# Patient Record
Sex: FEMALE | Employment: OTHER | URBAN - NONMETROPOLITAN AREA
[De-identification: names, ages, dates, MRNs, and addresses within clinical notes are randomized per-mention and may not be internally consistent; named-entity substitution may affect disease eponyms.]

---

## 2021-09-28 ENCOUNTER — APPOINTMENT (OUTPATIENT)
Dept: CT IMAGING | Age: 81
DRG: 394 | End: 2021-09-28
Attending: HOSPITALIST
Payer: MEDICARE

## 2021-09-28 ENCOUNTER — HOSPITAL ENCOUNTER (INPATIENT)
Age: 81
LOS: 7 days | Discharge: NURSING, REHAB OR LONG-TERM CARE | DRG: 394 | End: 2021-10-05
Attending: HOSPITALIST | Admitting: INTERNAL MEDICINE
Payer: MEDICARE

## 2021-09-28 ENCOUNTER — APPOINTMENT (OUTPATIENT)
Dept: GENERAL RADIOLOGY | Age: 81
DRG: 394 | End: 2021-09-28
Attending: HOSPITALIST
Payer: MEDICARE

## 2021-09-28 PROBLEM — K57.90 DIVERTICULOSIS: Status: ACTIVE | Noted: 2021-09-28

## 2021-09-28 PROBLEM — I10 HYPERTENSION: Status: ACTIVE | Noted: 2021-09-28

## 2021-09-28 PROBLEM — K92.2 GIB (GASTROINTESTINAL BLEEDING): Status: ACTIVE | Noted: 2021-09-28

## 2021-09-28 PROBLEM — I25.10 CAD (CORONARY ARTERY DISEASE): Status: ACTIVE | Noted: 2021-09-28

## 2021-09-28 PROBLEM — E11.9 DIABETES (HCC): Status: ACTIVE | Noted: 2021-09-28

## 2021-09-28 LAB
ABO/RH: NORMAL
ALBUMIN SERPL-MCNC: 2.7 G/DL (ref 3.5–5.2)
ALP BLD-CCNC: 211 U/L (ref 35–104)
ALT SERPL-CCNC: 17 U/L (ref 5–33)
AMPHETAMINE SCREEN, URINE: NEGATIVE
AMYLASE: 30 U/L (ref 28–100)
ANION GAP SERPL CALCULATED.3IONS-SCNC: 7 MMOL/L (ref 7–19)
ANTIBODY SCREEN: NORMAL
APTT: 34 SEC (ref 26–36.2)
AST SERPL-CCNC: 18 U/L (ref 5–32)
ATYPICAL LYMPHOCYTE RELATIVE PERCENT: 1 % (ref 0–8)
BACTERIA: ABNORMAL /HPF
BANDED NEUTROPHILS RELATIVE PERCENT: 8 % (ref 0–5)
BARBITURATE SCREEN URINE: NEGATIVE
BASOPHILS ABSOLUTE: 0 K/UL (ref 0–0.2)
BASOPHILS RELATIVE PERCENT: 0 % (ref 0–1)
BENZODIAZEPINE SCREEN, URINE: NEGATIVE
BILIRUB SERPL-MCNC: 0.8 MG/DL (ref 0.2–1.2)
BILIRUBIN URINE: NEGATIVE
BLOOD, URINE: ABNORMAL
BUN BLDV-MCNC: 19 MG/DL (ref 8–23)
CALCIUM SERPL-MCNC: 8.9 MG/DL (ref 8.8–10.2)
CANNABINOID SCREEN URINE: NEGATIVE
CHLORIDE BLD-SCNC: 108 MMOL/L (ref 98–111)
CHOLESTEROL, TOTAL: 85 MG/DL (ref 160–199)
CLARITY: CLEAR
CO2: 23 MMOL/L (ref 22–29)
COARSE CASTS, UA: ABNORMAL /LPF (ref 0–5)
COCAINE METABOLITE SCREEN URINE: NEGATIVE
COLOR: YELLOW
CREAT SERPL-MCNC: 0.5 MG/DL (ref 0.5–0.9)
EOSINOPHILS ABSOLUTE: 0 K/UL (ref 0–0.6)
EOSINOPHILS RELATIVE PERCENT: 0 % (ref 0–5)
EPITHELIAL CELLS, UA: ABNORMAL /HPF
FERRITIN: 137.4 NG/ML (ref 13–150)
FOLATE: 5 NG/ML (ref 4.8–37.3)
GFR AFRICAN AMERICAN: >59
GFR NON-AFRICAN AMERICAN: >60
GLUCOSE BLD-MCNC: 93 MG/DL (ref 74–109)
GLUCOSE URINE: NEGATIVE MG/DL
HBA1C MFR BLD: 4.7 % (ref 4–6)
HCT VFR BLD CALC: 32.6 % (ref 37–47)
HCT VFR BLD CALC: 36.6 % (ref 37–47)
HDLC SERPL-MCNC: 47 MG/DL (ref 65–121)
HEMOGLOBIN: 10.7 G/DL (ref 12–16)
HEMOGLOBIN: 12 G/DL (ref 12–16)
HYALINE CASTS: ABNORMAL /LPF (ref 0–5)
IMMATURE GRANULOCYTES #: 0.1 K/UL
INR BLD: 1.27 (ref 0.88–1.18)
IRON SATURATION: 7 % (ref 14–50)
IRON: 17 UG/DL (ref 37–145)
KETONES, URINE: NEGATIVE MG/DL
LDL CHOLESTEROL CALCULATED: 25 MG/DL
LEUKOCYTE ESTERASE, URINE: NEGATIVE
LIPASE: 13 U/L (ref 13–60)
LYMPHOCYTES ABSOLUTE: 1.1 K/UL (ref 1.1–4.5)
LYMPHOCYTES RELATIVE PERCENT: 10 % (ref 20–40)
Lab: NORMAL
MAGNESIUM: 1.8 MG/DL (ref 1.6–2.4)
MCH RBC QN AUTO: 32.1 PG (ref 27–31)
MCHC RBC AUTO-ENTMCNC: 32.8 G/DL (ref 33–37)
MCV RBC AUTO: 97.9 FL (ref 81–99)
METAMYELOCYTES RELATIVE PERCENT: 5 %
MONOCYTES ABSOLUTE: 0.2 K/UL (ref 0–0.9)
MONOCYTES RELATIVE PERCENT: 2 % (ref 0–10)
NEUTROPHILS ABSOLUTE: 8.6 K/UL (ref 1.5–7.5)
NEUTROPHILS RELATIVE PERCENT: 74 % (ref 50–65)
NITRITE, URINE: NEGATIVE
OPIATE SCREEN URINE: NEGATIVE
PDW BLD-RTO: 12.9 % (ref 11.5–14.5)
PH UA: 5 (ref 5–8)
PLATELET # BLD: 194 K/UL (ref 130–400)
PLATELET SLIDE REVIEW: ADEQUATE
PMV BLD AUTO: 9.7 FL (ref 9.4–12.3)
POTASSIUM SERPL-SCNC: 3.5 MMOL/L (ref 3.5–5)
PROTEIN UA: ABNORMAL MG/DL
PROTHROMBIN TIME: 16 SEC (ref 12–14.6)
RBC # BLD: 3.74 M/UL (ref 4.2–5.4)
RBC # BLD: NORMAL 10*6/UL
RBC UA: ABNORMAL /HPF (ref 0–2)
SODIUM BLD-SCNC: 138 MMOL/L (ref 136–145)
SPECIFIC GRAVITY UA: 1.02 (ref 1–1.03)
TOTAL IRON BINDING CAPACITY: 227 UG/DL (ref 250–400)
TOTAL PROTEIN: 5.2 G/DL (ref 6.6–8.7)
TRIGL SERPL-MCNC: 66 MG/DL (ref 0–149)
UROBILINOGEN, URINE: 1 E.U./DL
VITAMIN B-12: 294 PG/ML (ref 211–946)
WBC # BLD: 9.9 K/UL (ref 4.8–10.8)
WBC UA: ABNORMAL /HPF (ref 0–5)

## 2021-09-28 PROCEDURE — 83540 ASSAY OF IRON: CPT

## 2021-09-28 PROCEDURE — 85018 HEMOGLOBIN: CPT

## 2021-09-28 PROCEDURE — 81001 URINALYSIS AUTO W/SCOPE: CPT

## 2021-09-28 PROCEDURE — 36415 COLL VENOUS BLD VENIPUNCTURE: CPT

## 2021-09-28 PROCEDURE — 82746 ASSAY OF FOLIC ACID SERUM: CPT

## 2021-09-28 PROCEDURE — 82150 ASSAY OF AMYLASE: CPT

## 2021-09-28 PROCEDURE — 82728 ASSAY OF FERRITIN: CPT

## 2021-09-28 PROCEDURE — 83690 ASSAY OF LIPASE: CPT

## 2021-09-28 PROCEDURE — 1210000000 HC MED SURG R&B

## 2021-09-28 PROCEDURE — 74176 CT ABD & PELVIS W/O CONTRAST: CPT

## 2021-09-28 PROCEDURE — 82607 VITAMIN B-12: CPT

## 2021-09-28 PROCEDURE — 80307 DRUG TEST PRSMV CHEM ANLYZR: CPT

## 2021-09-28 PROCEDURE — 80053 COMPREHEN METABOLIC PANEL: CPT

## 2021-09-28 PROCEDURE — 71045 X-RAY EXAM CHEST 1 VIEW: CPT

## 2021-09-28 PROCEDURE — 86901 BLOOD TYPING SEROLOGIC RH(D): CPT

## 2021-09-28 PROCEDURE — 6360000002 HC RX W HCPCS: Performed by: INTERNAL MEDICINE

## 2021-09-28 PROCEDURE — 85014 HEMATOCRIT: CPT

## 2021-09-28 PROCEDURE — 85025 COMPLETE CBC W/AUTO DIFF WBC: CPT

## 2021-09-28 PROCEDURE — 85730 THROMBOPLASTIN TIME PARTIAL: CPT

## 2021-09-28 PROCEDURE — 6360000002 HC RX W HCPCS: Performed by: NURSE PRACTITIONER

## 2021-09-28 PROCEDURE — 83550 IRON BINDING TEST: CPT

## 2021-09-28 PROCEDURE — 2580000003 HC RX 258: Performed by: NURSE PRACTITIONER

## 2021-09-28 PROCEDURE — 86900 BLOOD TYPING SEROLOGIC ABO: CPT

## 2021-09-28 PROCEDURE — 2580000003 HC RX 258: Performed by: INTERNAL MEDICINE

## 2021-09-28 PROCEDURE — 83735 ASSAY OF MAGNESIUM: CPT

## 2021-09-28 PROCEDURE — 6370000000 HC RX 637 (ALT 250 FOR IP): Performed by: INTERNAL MEDICINE

## 2021-09-28 PROCEDURE — 99233 SBSQ HOSP IP/OBS HIGH 50: CPT | Performed by: INTERNAL MEDICINE

## 2021-09-28 PROCEDURE — C9113 INJ PANTOPRAZOLE SODIUM, VIA: HCPCS | Performed by: INTERNAL MEDICINE

## 2021-09-28 PROCEDURE — 83036 HEMOGLOBIN GLYCOSYLATED A1C: CPT

## 2021-09-28 PROCEDURE — 80061 LIPID PANEL: CPT

## 2021-09-28 PROCEDURE — 85610 PROTHROMBIN TIME: CPT

## 2021-09-28 PROCEDURE — 86850 RBC ANTIBODY SCREEN: CPT

## 2021-09-28 RX ORDER — ACETAMINOPHEN 650 MG/1
650 SUPPOSITORY RECTAL EVERY 6 HOURS PRN
Status: DISCONTINUED | OUTPATIENT
Start: 2021-09-28 | End: 2021-10-05 | Stop reason: HOSPADM

## 2021-09-28 RX ORDER — M-VIT,TX,IRON,MINS/CALC/FOLIC 27MG-0.4MG
1 TABLET ORAL DAILY
Status: DISCONTINUED | OUTPATIENT
Start: 2021-09-28 | End: 2021-10-05 | Stop reason: HOSPADM

## 2021-09-28 RX ORDER — LISINOPRIL 5 MG/1
5 TABLET ORAL DAILY
COMMUNITY

## 2021-09-28 RX ORDER — SODIUM CHLORIDE 9 MG/ML
INJECTION, SOLUTION INTRAVENOUS CONTINUOUS
Status: DISCONTINUED | OUTPATIENT
Start: 2021-09-28 | End: 2021-10-02

## 2021-09-28 RX ORDER — SODIUM CHLORIDE 0.9 % (FLUSH) 0.9 %
5-40 SYRINGE (ML) INJECTION EVERY 12 HOURS SCHEDULED
Status: DISCONTINUED | OUTPATIENT
Start: 2021-09-28 | End: 2021-10-05 | Stop reason: HOSPADM

## 2021-09-28 RX ORDER — PANTOPRAZOLE SODIUM 40 MG/10ML
40 INJECTION, POWDER, LYOPHILIZED, FOR SOLUTION INTRAVENOUS EVERY 12 HOURS
Status: DISCONTINUED | OUTPATIENT
Start: 2021-09-28 | End: 2021-10-01

## 2021-09-28 RX ORDER — ATORVASTATIN CALCIUM 20 MG/1
20 TABLET, FILM COATED ORAL NIGHTLY
Status: DISCONTINUED | OUTPATIENT
Start: 2021-09-28 | End: 2021-10-05 | Stop reason: HOSPADM

## 2021-09-28 RX ORDER — ACETAMINOPHEN 325 MG/1
650 TABLET ORAL EVERY 6 HOURS PRN
Status: DISCONTINUED | OUTPATIENT
Start: 2021-09-28 | End: 2021-10-05 | Stop reason: HOSPADM

## 2021-09-28 RX ORDER — SODIUM CHLORIDE 9 MG/ML
10 INJECTION INTRAVENOUS EVERY 12 HOURS
Status: DISCONTINUED | OUTPATIENT
Start: 2021-09-28 | End: 2021-10-01

## 2021-09-28 RX ORDER — CARVEDILOL 3.12 MG/1
3.12 TABLET ORAL 2 TIMES DAILY WITH MEALS
Status: DISCONTINUED | OUTPATIENT
Start: 2021-09-28 | End: 2021-10-05 | Stop reason: HOSPADM

## 2021-09-28 RX ORDER — M-VIT,TX,IRON,MINS/CALC/FOLIC 27MG-0.4MG
1 TABLET ORAL DAILY
Status: ON HOLD | COMMUNITY
End: 2021-09-28

## 2021-09-28 RX ORDER — ASPIRIN 325 MG
325 TABLET ORAL DAILY
Status: ON HOLD | COMMUNITY
End: 2021-10-05 | Stop reason: HOSPADM

## 2021-09-28 RX ORDER — LACTOBACILLUS RHAMNOSUS GG 10B CELL
1 CAPSULE ORAL DAILY
COMMUNITY

## 2021-09-28 RX ORDER — SODIUM CHLORIDE 0.9 % (FLUSH) 0.9 %
5-40 SYRINGE (ML) INJECTION PRN
Status: DISCONTINUED | OUTPATIENT
Start: 2021-09-28 | End: 2021-10-05 | Stop reason: HOSPADM

## 2021-09-28 RX ORDER — ATORVASTATIN CALCIUM 20 MG/1
20 TABLET, FILM COATED ORAL NIGHTLY
COMMUNITY

## 2021-09-28 RX ORDER — LISINOPRIL 5 MG/1
5 TABLET ORAL DAILY
Status: DISCONTINUED | OUTPATIENT
Start: 2021-09-29 | End: 2021-10-05 | Stop reason: HOSPADM

## 2021-09-28 RX ORDER — CARVEDILOL 3.12 MG/1
3.12 TABLET ORAL 2 TIMES DAILY WITH MEALS
COMMUNITY

## 2021-09-28 RX ORDER — SODIUM CHLORIDE 9 MG/ML
25 INJECTION, SOLUTION INTRAVENOUS PRN
Status: DISCONTINUED | OUTPATIENT
Start: 2021-09-28 | End: 2021-10-05 | Stop reason: HOSPADM

## 2021-09-28 RX ORDER — FAMOTIDINE 20 MG/1
20 TABLET, FILM COATED ORAL 2 TIMES DAILY
COMMUNITY

## 2021-09-28 RX ORDER — ONDANSETRON 4 MG/1
4 TABLET, ORALLY DISINTEGRATING ORAL EVERY 8 HOURS PRN
Status: DISCONTINUED | OUTPATIENT
Start: 2021-09-28 | End: 2021-10-05 | Stop reason: HOSPADM

## 2021-09-28 RX ORDER — ONDANSETRON 2 MG/ML
4 INJECTION INTRAMUSCULAR; INTRAVENOUS EVERY 6 HOURS PRN
Status: DISCONTINUED | OUTPATIENT
Start: 2021-09-28 | End: 2021-10-05 | Stop reason: HOSPADM

## 2021-09-28 RX ADMIN — SODIUM CHLORIDE, PRESERVATIVE FREE 1000 MG: 5 INJECTION INTRAVENOUS at 17:05

## 2021-09-28 RX ADMIN — SODIUM CHLORIDE: 9 INJECTION, SOLUTION INTRAVENOUS at 18:39

## 2021-09-28 RX ADMIN — ATORVASTATIN CALCIUM 20 MG: 20 TABLET, FILM COATED ORAL at 19:34

## 2021-09-28 RX ADMIN — PANTOPRAZOLE SODIUM 40 MG: 40 INJECTION, POWDER, FOR SOLUTION INTRAVENOUS at 17:05

## 2021-09-28 RX ADMIN — SODIUM CHLORIDE, PRESERVATIVE FREE 10 ML: 5 INJECTION INTRAVENOUS at 17:06

## 2021-09-28 RX ADMIN — CARVEDILOL 3.12 MG: 3.12 TABLET, FILM COATED ORAL at 17:05

## 2021-09-28 RX ADMIN — Medication 1 TABLET: at 17:05

## 2021-09-28 ASSESSMENT — ENCOUNTER SYMPTOMS
DIARRHEA: 1
BACK PAIN: 0
BLOOD IN STOOL: 1
TROUBLE SWALLOWING: 0
NAUSEA: 1
CHEST TIGHTNESS: 0
SHORTNESS OF BREATH: 0
VOMITING: 1
ABDOMINAL PAIN: 1
SORE THROAT: 0

## 2021-09-28 NOTE — CONSULTS
[employer] [facility] VANESSA Dawson 78, [address] [facility]                                  CONSULTATION    PATIENT NAME: Rose Lopez                     :        1940  MED REC NO:   [id number]                              ROOM:       [facility]  ACCOUNT NO:   [de-identified]                           ADMIT DATE: 2021  PROVIDER:     Medina Mendoza MD    CONSULT DATE:  2021    ASSESSMENT:  1.  Sudden onset of weakness, dizziness, diaphoresis, nausea and  vomiting, significance to be clarified. The patient denies any other  upper GI complaints. 2.  Sudden bowel cramping, diarrhea and then passage of maroon stools  indicative of GI bleed. Possible infectious vs ischemic colitis. 3.  History of an episode of diverticulitis approximately 4 to 5 years  ago. RECOMMENDATIONS:  1. Continue treatment of urinary tract infection. 2.  Protonix. 3.  Monitor H and H.  4.  Clear liquid diet today and then consider bowel prep tomorrow  followed by EGD and colonoscopy on Thursday. HISTORY OF PRESENT ILLNESS:  This pleasant 80-year-old white female who  is interviewed with her daughter present in the room to help provide  clinical details of recent events and medical history. The patient has  a background history of coronary artery disease with prior stents,  hypertension, hyperlipidemia, and diverticulosis. She also had a  surgical history of undergoing a craniotomy for aneurysm a few years  ago. The patient denies any prior GI illnesses in the past.. She was  otherwise well except for possibly some tendency towards mild  constipation interpreted as passing hard pellet stool daily. She ate   dinner with her daughter, but then suddenly developed weakness,  dizziness, nausea, vomiting, diaphoresis and then eventually bowel  cramping with diarrhea. She then began passing maroon colored stools  with some bright red blood.   The daughter had ate the same meal and  therefore, there is no suspicion of any contaminated food or beverage. There is no history of travel or exposures. The patient currently  denies any abdominal pain or tenderness on exam.  Labs at the referring  hospital showed initial CBC with hemoglobin 17 falling to 12 gm. CAT  scan of the abdomen is reported to show diverticulosis without  diverticulitis and moderate amount of retained stool in the bowel  despite the diarrhea. She denies any background history of fever or  weight loss. PAST MEDICAL HISTORY:  Medical illnesses are mentioned above. The  patient has no background history of GERD, peptic ulcer disease, or  prior GI bleeding. SURGICAL HISTORY:  Brain aneurysm repair. No prior abdominal surgeries. No prior EGD or colonoscopy in the past.    CURRENT MAINTENANCE MEDICATIONS:  See admission medication  reconciliation list.    SOCIAL HISTORY:  No current habits. FAMILY HISTORY:  Noncontributory. PHYSICAL EXAMINATION:  GENERAL:  Alert, no acute distress, afebrile. VITAL SIGNS:  Hemodynamically stable. HEENT:  Sclerae white. Conjunctivae pink. Buccal mucosa moist.  NECK:  Supple. LUNGS:  Clear. HEART:  No S3 or murmur. ABDOMEN:  Nontender abdomen. Normal bowel sounds. No organomegaly. Stool heme-positive. The procedures of EGD and colonoscopy have been discussed with the  patient including indication, alternatives, and risks.         Saray Purcell MD    D: 09/28/2021 17:10:53      T: 09/28/2021 17:15:11     [email or web address]  Job#: [id number]     Doc#: [id number]    CC:

## 2021-09-28 NOTE — PROGRESS NOTES
[de-identified]year old with a PMH of CAD s/p stent years ago, HTN, HLD, Diverticulosis presenting from [facility] as a transfer after she was noted to have GIB for GI consultation. Patient originally presented to their facility 9/26/21 for NBNB nausea and vomiting found to have a moderate to large amount of stool on CT abd/pel with patient receiving enemas and stool softeners. Noted to have maroon colored blood per rectum on nursing report. Patient is AAOx3 (person, place, time) but isn't the greatest historian. Comfortable currently and denies chest pain, dyspnea, nausea, vomiting, diarrhea, constipation, fevers, chills, sweats, dysuria, cough, sick contacts, or recent travel. Vitals/Labs/Imaging reviewed. Alert/BLAE/RRR    GIB: GI consulted. Monitor H/H and transfer PRN. CT A/P.  HTN: Monitor BP and adjust medications PRN. CAD: Telemetry. Denies chest pain. Statin. No aspirin for GIB. Supportive management. Full H&P to follow with hospitalist nurse practitioner.

## 2021-09-28 NOTE — PROGRESS NOTES
4 Eyes Skin Assessment    Annia Friedman is being assessed upon: Admission    I agree that Manny Hollins, RN, along with Merline Muff, RN (either 2 RN's or 1 LPN and 1 RN) have performed a thorough Head to Toe Skin Assessment on the patient. ALL assessment sites listed below have been assessed. Areas assessed by both nurses:     [x]   Head, Face, and Ears   [x]   Shoulders, Back, and Chest  [x]   Arms, Elbows, and Hands   [x]   Coccyx, Sacrum, and Ischium  [x]   Legs, Feet, and Heels    Does the Patient have Skin Breakdown?  No    Zay Prevention initiated: NA  Wound Care Orders initiated: NA    [facility] nurse consulted for Pressure Injury (Stage 3,4, Unstageable, DTI, NWPT, and Complex wounds) and New or Established Ostomies: NA        Primary Nurse eSignature: Lizet Herron RN on 9/28/2021 at 4:21 PM      Co-Signer eSignature: Electronically signed by Merline Muff, RN on 9/28/21 at 6:13 PM CDT

## 2021-09-28 NOTE — H&P
Marcellus Masters - History & Physical      PCP: No primary care provider on file. Date of Admission: 9/28/2021    Date of Service: 9/28/2021    Chief Complaint:  GIB    History Of Present Illness: The patient is a [de-identified] y.o. female who presented to [facility] from OSH ([address]) complaining of GIB. She originally presented to OSH for N/V/D and abd pain. She states she was also having some urinary frequency and urgency at the time of admission to OSH. At outside hospital patient had drop in hemoglobin from 15.7 to 12.5 with hemoccult positive stool. She states the nausea and vomiting have improved. She has continued to have moderate sized dark BMs. Patient was transferred to [facility] for GI services. Past Medical History:        Diagnosis Date    Brain aneurysm 2019    history of brain aneurysm with surgical intervention at [facility] in [facility] CAD (coronary artery disease)     Diverticulitis     H/O heart artery stent     Hypertension        Past Surgical History:        Procedure Laterality Date    CRANIOTOMY      [address] Medications:  Prior to Admission medications    Medication Sig Start Date End Date Taking?  Authorizing Provider   aspirin 325 MG tablet Take 325 mg by mouth daily   Yes Historical Provider, MD   atorvastatin (LIPITOR) 20 MG tablet Take 20 mg by mouth nightly   Yes Historical Provider, MD   carvedilol (COREG) 3.125 MG tablet Take 3.125 mg by mouth 2 times daily (with meals)   Yes Historical Provider, MD   lactobacillus (CULTURELLE) CAPS capsule Take 1 capsule by mouth daily   Yes Historical Provider, MD   famotidine (PEPCID) 20 MG tablet Take 20 mg by mouth 2 times daily   Yes Historical Provider, MD   lisinopril (PRINIVIL;ZESTRIL) 5 MG tablet Take 5 mg by mouth daily   Yes Historical Provider, MD   Omega-3 Fatty Acids (OMEGA 3 PO) Take 1 capsule by mouth 2 times daily   Yes Historical Provider, MD   NONFORMULARY 1 capsule daily [employer]   Yes Historical Provider, MD       Allergies:    Adhesive tape and Phenergan [promethazine]    Social History:    The patient currently lives with her daughter  Tobacco:   has no history on file for tobacco use. Alcohol:   reports current alcohol use of about 7.0 standard drinks of alcohol per week. Illicit Drugs: Never    Family History:  No family history on file. Review of Systems:   Review of Systems   Constitutional: Positive for activity change, appetite change and fatigue. Negative for chills and fever. HENT: Negative for nosebleeds, sore throat and trouble swallowing. Respiratory: Negative for chest tightness and shortness of breath. Cardiovascular: Negative for chest pain and leg swelling. Gastrointestinal: Positive for abdominal pain, blood in stool, diarrhea, nausea and vomiting. Genitourinary: Positive for dysuria, frequency and urgency. Negative for difficulty urinating. Musculoskeletal: Negative for back pain and myalgias. Neurological: Negative for dizziness, light-headedness and headaches. Psychiatric/Behavioral: Negative for agitation and confusion. 14 point review of systems is negative except as specifically addressed above. Physical Examination:  BP (!) 149/89   Pulse 98   Temp 98.6 °F (37 °C) (Temporal)   Resp 20   SpO2 90%   Physical Exam  Constitutional:       Appearance: She is not ill-appearing. HENT:      Head: Normocephalic. Nose: Nose normal.      Mouth/Throat:      Mouth: Mucous membranes are moist.      Pharynx: Oropharynx is clear. Eyes:      Extraocular Movements: Extraocular movements intact. Pupils: Pupils are equal, round, and reactive to light. Cardiovascular:      Rate and Rhythm: Regular rhythm. Tachycardia present. Pulses: Normal pulses. Heart sounds: Normal heart sounds. Pulmonary:      Effort: Pulmonary effort is normal.      Breath sounds: Normal breath sounds. Abdominal:      General: Abdomen is flat. Bowel sounds are normal. There is no distension. Palpations: Abdomen is soft. Tenderness: There is abdominal tenderness (LLQ). There is no guarding or rebound. Musculoskeletal:         General: Normal range of motion. Cervical back: Normal range of motion and neck supple. Right lower leg: No edema. Left lower leg: No edema. Skin:     General: Skin is warm and dry. Capillary Refill: Capillary refill takes less than 2 seconds. Coloration: Skin is pale. Neurological:      General: No focal deficit present. Mental Status: She is alert and oriented to person, place, and time. Diagnostic Data:  CBC:No results for input(s): WBC, HGB, HCT, PLT in the last 72 hours. BMP:No results for input(s): NA, K, CL, CO2, BUN, CREATININE, CALCIUM, PHOS in the last 72 hours. Invalid input(s): Kerrie Villalobos results for input(s): AST, ALT, BILIDIR, BILITOT, ALKPHOS in the last 72 hours. Coag Panel: No results for input(s): INR, PROTIME, APTT in the last 72 hours. Cardiac Enzymes: No results for input(s): Sandi Gazella in the last 72 hours. ABGs:No results found for: PHART, PO2ART, FYU1DLF  Urinalysis:No results found for: NITRU, WBCUA, BACTERIA, RBCUA, BLOODU, SPECGRAV, GLUCOSEU  A1C: No results for input(s): LABA1C in the last 72 hours. ABG:No results for input(s): PHART, STW0MRV, PO2ART, BFB0WXR, BEART, HGBAE, J6WQRYWI, CARBOXHGBART in the last 72 hours. No results found.     Assessment/Plan:  Principal Problem:    GIB (gastrointestinal bleeding)   -GI consult   -ct abd/pelvis   -serial HH   -amylase/lipase   -protonix IV   -type and screen, will transfuse if needed    Active Problems:    Hypertension   -continue home medications   -monitor closely      CAD (coronary artery disease)   -noted, hold ASA      Diverticulosis   -noted  UTI   -noted at OSH   -rocephin   -repeat UA     Resolved Problems:    * No resolved hospital problems.  *       Signed:  SAVANAH Ramírez - CNP, 9/28/2021 3:54 PM

## 2021-09-28 NOTE — PROGRESS NOTES
Andrei Sanchez arrived to room # 314. Presented with: GI Bleed, UTI  Mental Status: Patient is oriented, alert, coherent, logical, thought processes intact and able to concentrate and follow conversation. Vitals:    09/28/21 1447   BP: (!) 149/89   Pulse: 98   Resp: 20   Temp: 98.6 °F (37 °C)   SpO2: 90%     Patient safety contract and falls prevention contract reviewed with patient Yes. Oriented Patient and Family to room. Call light within reach. Yes.   Needs, issues or concerns expressed at this time: no.      Electronically signed by Thalia Plata RN on 9/28/2021 at 4:20 PM

## 2021-09-29 PROBLEM — K52.9 COLITIS: Status: ACTIVE | Noted: 2021-09-29

## 2021-09-29 LAB
ALBUMIN SERPL-MCNC: 2.6 G/DL (ref 3.5–5.2)
ALP BLD-CCNC: 169 U/L (ref 35–104)
ALT SERPL-CCNC: 19 U/L (ref 5–33)
ANION GAP SERPL CALCULATED.3IONS-SCNC: 7 MMOL/L (ref 7–19)
AST SERPL-CCNC: 20 U/L (ref 5–32)
BANDED NEUTROPHILS RELATIVE PERCENT: 7 % (ref 0–5)
BASOPHILS ABSOLUTE: 0 K/UL (ref 0–0.2)
BASOPHILS RELATIVE PERCENT: 0 % (ref 0–1)
BILIRUB SERPL-MCNC: 0.6 MG/DL (ref 0.2–1.2)
BUN BLDV-MCNC: 13 MG/DL (ref 8–23)
C DIFF TOXIN/ANTIGEN: NORMAL
CALCIUM SERPL-MCNC: 8 MG/DL (ref 8.8–10.2)
CHLORIDE BLD-SCNC: 109 MMOL/L (ref 98–111)
CO2: 22 MMOL/L (ref 22–29)
CREAT SERPL-MCNC: 0.4 MG/DL (ref 0.5–0.9)
EOSINOPHILS ABSOLUTE: 0.28 K/UL (ref 0–0.6)
EOSINOPHILS RELATIVE PERCENT: 3 % (ref 0–5)
GFR AFRICAN AMERICAN: >59
GFR NON-AFRICAN AMERICAN: >60
GLUCOSE BLD-MCNC: 103 MG/DL (ref 74–109)
HCT VFR BLD CALC: 31.6 % (ref 37–47)
HCT VFR BLD CALC: 33.7 % (ref 37–47)
HCT VFR BLD CALC: 33.7 % (ref 37–47)
HCT VFR BLD CALC: 36.3 % (ref 37–47)
HCT VFR BLD CALC: 36.4 % (ref 37–47)
HEMOGLOBIN: 10.5 G/DL (ref 12–16)
HEMOGLOBIN: 11.1 G/DL (ref 12–16)
HEMOGLOBIN: 11.3 G/DL (ref 12–16)
HEMOGLOBIN: 11.8 G/DL (ref 12–16)
HEMOGLOBIN: 11.9 G/DL (ref 12–16)
IMMATURE GRANULOCYTES #: 0.1 K/UL
LYMPHOCYTES ABSOLUTE: 0.8 K/UL (ref 1.1–4.5)
LYMPHOCYTES RELATIVE PERCENT: 9 % (ref 20–40)
MAGNESIUM: 1.7 MG/DL (ref 1.6–2.4)
MCH RBC QN AUTO: 31.6 PG (ref 27–31)
MCHC RBC AUTO-ENTMCNC: 32.5 G/DL (ref 33–37)
MCV RBC AUTO: 97.3 FL (ref 81–99)
MONOCYTES ABSOLUTE: 0.1 K/UL (ref 0–0.9)
MONOCYTES RELATIVE PERCENT: 1 % (ref 0–10)
NEUTROPHILS ABSOLUTE: 8.1 K/UL (ref 1.5–7.5)
NEUTROPHILS RELATIVE PERCENT: 80 % (ref 50–65)
PDW BLD-RTO: 12.8 % (ref 11.5–14.5)
PLATELET # BLD: 223 K/UL (ref 130–400)
PLATELET SLIDE REVIEW: ADEQUATE
PMV BLD AUTO: 9.8 FL (ref 9.4–12.3)
POTASSIUM SERPL-SCNC: 3.2 MMOL/L (ref 3.5–5)
RBC # BLD: 3.73 M/UL (ref 4.2–5.4)
RBC # BLD: NORMAL 10*6/UL
SODIUM BLD-SCNC: 138 MMOL/L (ref 136–145)
TOTAL PROTEIN: 4.4 G/DL (ref 6.6–8.7)
WBC # BLD: 9.3 K/UL (ref 4.8–10.8)

## 2021-09-29 PROCEDURE — 85025 COMPLETE CBC W/AUTO DIFF WBC: CPT

## 2021-09-29 PROCEDURE — 2580000003 HC RX 258: Performed by: INTERNAL MEDICINE

## 2021-09-29 PROCEDURE — 36415 COLL VENOUS BLD VENIPUNCTURE: CPT

## 2021-09-29 PROCEDURE — 85018 HEMOGLOBIN: CPT

## 2021-09-29 PROCEDURE — 6360000002 HC RX W HCPCS: Performed by: INTERNAL MEDICINE

## 2021-09-29 PROCEDURE — 87449 NOS EACH ORGANISM AG IA: CPT

## 2021-09-29 PROCEDURE — 2500000003 HC RX 250 WO HCPCS: Performed by: NURSE PRACTITIONER

## 2021-09-29 PROCEDURE — 85014 HEMATOCRIT: CPT

## 2021-09-29 PROCEDURE — 99232 SBSQ HOSP IP/OBS MODERATE 35: CPT | Performed by: INTERNAL MEDICINE

## 2021-09-29 PROCEDURE — 87324 CLOSTRIDIUM AG IA: CPT

## 2021-09-29 PROCEDURE — 6370000000 HC RX 637 (ALT 250 FOR IP): Performed by: INTERNAL MEDICINE

## 2021-09-29 PROCEDURE — 80053 COMPREHEN METABOLIC PANEL: CPT

## 2021-09-29 PROCEDURE — 6360000002 HC RX W HCPCS: Performed by: NURSE PRACTITIONER

## 2021-09-29 PROCEDURE — C9113 INJ PANTOPRAZOLE SODIUM, VIA: HCPCS | Performed by: INTERNAL MEDICINE

## 2021-09-29 PROCEDURE — 87045 FECES CULTURE AEROBIC BACT: CPT

## 2021-09-29 PROCEDURE — 87427 SHIGA-LIKE TOXIN AG IA: CPT

## 2021-09-29 PROCEDURE — 83735 ASSAY OF MAGNESIUM: CPT

## 2021-09-29 PROCEDURE — 87899 AGENT NOS ASSAY W/OPTIC: CPT

## 2021-09-29 PROCEDURE — 87015 SPECIMEN INFECT AGNT CONCNTJ: CPT

## 2021-09-29 PROCEDURE — 2580000003 HC RX 258: Performed by: NURSE PRACTITIONER

## 2021-09-29 PROCEDURE — 1210000000 HC MED SURG R&B

## 2021-09-29 RX ORDER — POTASSIUM CHLORIDE 7.45 MG/ML
10 INJECTION INTRAVENOUS PRN
Status: DISCONTINUED | OUTPATIENT
Start: 2021-09-29 | End: 2021-10-05 | Stop reason: HOSPADM

## 2021-09-29 RX ORDER — POLYETHYLENE GLYCOL 3350 17 G/17G
152 POWDER, FOR SOLUTION ORAL ONCE
Status: COMPLETED | OUTPATIENT
Start: 2021-09-29 | End: 2021-09-29

## 2021-09-29 RX ORDER — POTASSIUM CHLORIDE 20 MEQ/1
40 TABLET, EXTENDED RELEASE ORAL PRN
Status: DISCONTINUED | OUTPATIENT
Start: 2021-09-29 | End: 2021-10-05 | Stop reason: HOSPADM

## 2021-09-29 RX ADMIN — PANTOPRAZOLE SODIUM 40 MG: 40 INJECTION, POWDER, FOR SOLUTION INTRAVENOUS at 16:11

## 2021-09-29 RX ADMIN — METRONIDAZOLE 500 MG: 500 INJECTION, SOLUTION INTRAVENOUS at 08:38

## 2021-09-29 RX ADMIN — LISINOPRIL 5 MG: 5 TABLET ORAL at 08:37

## 2021-09-29 RX ADMIN — METRONIDAZOLE 500 MG: 500 INJECTION, SOLUTION INTRAVENOUS at 16:10

## 2021-09-29 RX ADMIN — SODIUM CHLORIDE, PRESERVATIVE FREE 1000 MG: 5 INJECTION INTRAVENOUS at 16:11

## 2021-09-29 RX ADMIN — CARVEDILOL 3.12 MG: 3.12 TABLET, FILM COATED ORAL at 08:37

## 2021-09-29 RX ADMIN — CARVEDILOL 3.12 MG: 3.12 TABLET, FILM COATED ORAL at 16:11

## 2021-09-29 RX ADMIN — SODIUM CHLORIDE, PRESERVATIVE FREE 10 ML: 5 INJECTION INTRAVENOUS at 16:12

## 2021-09-29 RX ADMIN — SODIUM CHLORIDE: 9 INJECTION, SOLUTION INTRAVENOUS at 08:40

## 2021-09-29 RX ADMIN — SODIUM CHLORIDE, PRESERVATIVE FREE 10 ML: 5 INJECTION INTRAVENOUS at 05:21

## 2021-09-29 RX ADMIN — POLYETHYLENE GLYCOL 3350 152 G: 17 POWDER, FOR SOLUTION ORAL at 18:00

## 2021-09-29 RX ADMIN — Medication 1 TABLET: at 08:37

## 2021-09-29 RX ADMIN — ATORVASTATIN CALCIUM 20 MG: 20 TABLET, FILM COATED ORAL at 20:09

## 2021-09-29 RX ADMIN — PANTOPRAZOLE SODIUM 40 MG: 40 INJECTION, POWDER, FOR SOLUTION INTRAVENOUS at 05:21

## 2021-09-29 ASSESSMENT — ENCOUNTER SYMPTOMS
SHORTNESS OF BREATH: 0
BACK PAIN: 0
BLOOD IN STOOL: 1
TROUBLE SWALLOWING: 0
CHEST TIGHTNESS: 0
DIARRHEA: 1
ABDOMINAL PAIN: 1
NAUSEA: 0
SORE THROAT: 0
VOMITING: 0

## 2021-09-29 NOTE — PROGRESS NOTES
Acute abdominal pain, nausea and vomiting subsided, non-acute GI Bleed stable with Hgb 10.5 gm, stool cultures pending, CT shows a segmental colitis of left colon suspicious for ischemic colitis and not diverticulitis. Plan: bowel prep today the EGD and colonoscopy tomorrow.

## 2021-09-29 NOTE — CARE COORDINATION
Date / Time of Evaluation:   9/29/2021    2:08 PM  Assessment Completed by:   JONATHAN Weber      Patient Name:   Yevgeniy Cobos  MRN:   [id number]  [place]:   1940    Patient Admission Status:   Inpatient [101]    Patient Contact Information:    [address] [place] [address]  [phone] (home)   No relevant phone numbers on file. Above information verified? [x]   Yes  []   No    (Best Practice:   Have patient/caregiver verify above address and phone number by stating out loud their current address and reachable phone number. Initial Assessment Completed at bedside with:      [x]   Patient  [x]   Family/Caregiver/Guardian   []   Other:      Current PCP:  Dr. Rox Pacheco in [address]        PCP verified? [x]   Yes  []   No    Emergency Contacts:    Extended Emergency Contact Information  Primary Emergency Contact: [address] [facility], [address] Phone: [phone]  Relation: Child  Secondary Emergency Contact: Monica King  [employer] Phone: [phone]  Relation: Child    Advance Directives:    Does Ms. Sariah Carmona have an advance directive in her electronic medical record? []   Yes  [x]   No    Code Status:   Full Code      Have you been vaccinated for COVID-19 (SARS-CoV-2)? [x]   Yes  []   No                   If so, when?     Which :         []   Pfizer-BioNTech  [x]   Moderna  []   [employer]  []   Other:       Do you have any of the following unmet social needs that would keep you from returning home safely:    []   Yes  [x]   No                    Unmet Social Needs:           []   Living Situation/Housing  []   Food  []   Stroke Education   []   Utilities  []   Personal Safety  []   Financial Strain  []   Employment  []   Mental Health  []   Substance Abuse  []   Transportation Barriers    Additional Unmet Social Needs Notes:           Financial:    Payor: Chong Martin / Plan: [facility] [employer] / Product Type: *No Product type* /     Pre-Cert required for SNF: [x]   Yes  []   No    Have Long Term Care Insurance:      []   Yes  [x]   No      Pharmacy:    [facility] [address] [place] [address] [phone] [facility] [phone]  [address] [place] [id number]  Phone: [phone] Fax: [phone]    Potential assistance purchasing medications? []   Yes  [x]   No      ADLS:       Support System:    children;       Current Home Environment:   Home with daughterBubba    Steps:       [x]   Yes  []   No    If yes, how many? Plans to RETURN to current housing:  Either swing bed or HH    [x]   Yes  []   No    Barriers to RETURNING to current housing:        Currently ACTIVE with Home Health CARE:      []   Yes  [x]   No    Home Health Care Agency:        DME Provider:    None ; has a cane only      Had [address] [employer] [facility] prior to admission:      []   Yes  [x]   No        Active with HD/PD prior to admission:             []   Yes  [x]   No    Nephrologist:     HD Center:         Transition Plan:    possible for [address] bed vs [address] [place] [address] for Discharge:  Private vehicle    Factors facilitating achievement of predicted outcomes:     Barriers to discharge:        Patient Deficits:    []   Yes   [x]   No    If yes:    []   Confusion/Memory  []   Visual  []   Motor/Sensory         []   Right arm         []   Right leg         []   Left arm         []   Left leg  []   Language/Speech         []   Aphasia         []   Dysarthria         []   Swallow         Anne Coma Scale  Eye Opening: Spontaneous  Best Verbal Response: Oriented  Best Motor Response: Obeys commands  [place] Coma Scale Score: 15    Patient Deficit Notes:            Additional CM/SW Notes: Pt sitting up in bed; daughter sitting by the window in the room; lives at home with daughter; no HH at current; but if d/c's home, they would like it ordered; daughter is interested in [address] as an option;         Wilton Machado and/or her family were provided with choice of provider:    [x]   Yes   []   No        []   Stroke education booklet reviewed and given to patient/family/caregiver/guardian. All questions answered all questions answered appropriately and efficiently per family.       Neena Mirza [facility]  Phone:   [phone]          Fax:   [phone]  Electronically signed by JONATHAN Mirza on 9/29/2021 at 4:27 PM

## 2021-09-29 NOTE — PROGRESS NOTES
One BM at this time- clear mucousy output with strawberry red mucousy output- medium size.     Electronically signed by Juana Mosqueda RN on 9/28/2021 at 7:20 PM

## 2021-09-29 NOTE — PROGRESS NOTES
[address] [facility]      Patient:  Kaleta Dakin  YOB: 1940  Date of Service: 9/29/2021  MRN: [id number]   Acct: [de-identified]   Primary Care Physician: No primary care provider on file. Advance Directive: Full Code  Admit Date: 9/28/2021       Hospital Day: 1  Portions of this note have been copied forward, however, changed to reflect the most current clinical status of this patient. CHIEF COMPLAINT GIB    SUBJECTIVE:  Patient is sitting up in the chair this morning. She reports one BM overnight and one this morning. She states \"they were both mucousy. \" She denies nausea and vomiting. Reports improvement in pain. CUMULATIVE HOSPITAL COURSE:  The patient is a [de-identified] y.o. female who presented to [facility] from OSH ([address]) complaining of GIB. She originally presented to OSH for N/V/D and abd pain. She stated she was also having some urinary frequency and urgency at the time of admission to OSH. At outside hospital patient had drop in hemoglobin from 15.7 to 12.5 with hemoccult positive stool. She stated the nausea and vomiting had improved. She had continued to have moderate sized dark BMs. Patient was transferred to [address] [place] [address] for GI services. Monitoring serial HH with improvement. Rocephin for UTI noted at OSH. Added flagyl this AM as CT indicated possible colitis. GI is following, discussed possible colonoscopy with patient. Will attempt to obtain stool culture. Review of Systems:   Review of Systems   Constitutional: Positive for activity change, appetite change (improved) and fatigue. Negative for chills and fever. HENT: Negative for nosebleeds, sore throat and trouble swallowing. Respiratory: Negative for chest tightness and shortness of breath. Cardiovascular: Negative for chest pain and leg swelling. Gastrointestinal: Positive for abdominal pain, blood in stool and diarrhea. Negative for nausea and vomiting.    Genitourinary: Negative for difficulty urinating, dysuria, frequency and urgency. Musculoskeletal: Negative for back pain and myalgias. Neurological: Negative for dizziness, light-headedness and headaches. Psychiatric/Behavioral: Negative for agitation and confusion. 14 point review of systems is negative except as specifically addressed above. Objective:   VITALS:  BP (!) 122/53   Pulse 85   Temp 98.4 °F (36.9 °C)   Resp 16   Wt 163 lb 6 oz (74.1 kg)   SpO2 93%   24HR INTAKE/OUTPUT:    Intake/Output Summary (Last 24 hours) at 9/29/2021 1116  Last data filed at 9/29/2021 1017  Gross per 24 hour   Intake 1081 ml   Output --   Net 1081 ml       Physical Exam  Constitutional:       Appearance: She is not ill-appearing. HENT:      Head: Normocephalic. Nose: Nose normal.      Mouth/Throat:      Mouth: Mucous membranes are moist.      Pharynx: Oropharynx is clear. Eyes:      Extraocular Movements: Extraocular movements intact. Pupils: Pupils are equal, round, and reactive to light. Cardiovascular:      Rate and Rhythm: Normal rate and regular rhythm. Pulses: Normal pulses. Heart sounds: Normal heart sounds. Pulmonary:      Effort: Pulmonary effort is normal.      Breath sounds: Normal breath sounds. Abdominal:      General: Abdomen is flat. Bowel sounds are normal. There is no distension. Palpations: Abdomen is soft. Tenderness: There is abdominal tenderness (LLQ). There is no guarding or rebound. Musculoskeletal:         General: Normal range of motion. Cervical back: Normal range of motion and neck supple. Right lower leg: No edema. Left lower leg: No edema. Skin:     General: Skin is warm and dry. Capillary Refill: Capillary refill takes less than 2 seconds. Coloration: Skin is pale. Neurological:      General: No focal deficit present. Mental Status: She is alert and oriented to person, place, and time.              Medications:      sodium chloride      sodium chloride 100 mL/hr at 09/29/21 0840      metroNIDAZOLE  500 mg IntraVENous Q8H    atorvastatin  20 mg Oral Nightly    carvedilol  3.125 mg Oral BID WC    lisinopril  5 mg Oral Daily    therapeutic multivitamin-minerals  1 tablet Oral Daily    sodium chloride flush  5-40 mL IntraVENous 2 times per day    pantoprazole  40 mg IntraVENous Q12H    And    sodium chloride (PF)  10 mL IntraVENous Q12H    cefTRIAXone (ROCEPHIN) IV  1,000 mg IntraVENous Q24H     potassium chloride **OR** potassium alternative oral replacement **OR** potassium chloride, sodium chloride flush, sodium chloride, ondansetron **OR** ondansetron, acetaminophen **OR** acetaminophen  ADULT DIET; Clear Liquid     Lab and other Data:     Recent Labs     09/28/21  1546 09/28/21  1546 09/28/21  2057 09/29/21  0318 09/29/21  0854   WBC 9.9  --   --   --  9.3   HGB 12.0   < > 10.7* 10.5* 11.8*  11.9*     --   --   --  223    < > = values in this interval not displayed. Recent Labs     09/28/21  1546 09/29/21  0318    138   K 3.5 3.2*    109   CO2 23 22   BUN 19 13   CREATININE 0.5 0.4*   GLUCOSE 93 103     Recent Labs     09/28/21  1546 09/29/21 0318   AST 18 20   ALT 17 19   BILITOT 0.8 0.6   ALKPHOS 211* 169*     Troponin T: No results for input(s): TROPONINI in the last 72 hours. Pro-BNP: No results for input(s): BNP in the last 72 hours. INR:   Recent Labs     09/28/21  1546   INR 1.27*     UA:  Recent Labs     09/28/21  1635   COLORU YELLOW   PHUR 5.0   WBCUA 2-4   RBCUA 2-4   BACTERIA Rare*   CLARITYU Clear   SPECGRAV 1.022   LEUKOCYTESUR Negative   UROBILINOGEN 1.0   BILIRUBINUR Negative   BLOODU TRACE*   GLUCOSEU Negative     A1C:   Recent Labs     09/28/21  1546   LABA1C 4.7     ABG:No results for input(s): PHART, LCI6MOK, PO2ART, OZB9KQS, BEART, HGBAE, A4LOAQSM, CARBOXHGBART in the last 72 hours. RAD:   CT ABDOMEN PELVIS WO CONTRAST Additional Contrast? None    Result Date: 9/28/2021  1.  There is a segmental colitis involving the descending and sigmoid colon with circumferential thickening of the colonic wall and pericolonic inflammatory stranding. This is likely infectious. There is free fluid in the perihepatic and perisplenic space as well as within the gutters and pelvis. This may be reactive in nature. 2. There is a cystic mass within the anterior left pelvis which I suspect is related to the left ovary. An epithelial neoplasm of the left ovary is not excluded and this would warrant gynecologic consultation and further imaging characterization. The uterus and right adnexa are unremarkable. 3. Fat-containing periumbilical hernia. 4. Heavy atheromatous calcification of the abdominal aorta and iliac arteries with no evidence of aneurysm. 5. No evidence of nephrolithiasis or obstructive uropathy. 6. Small right effusion. Bibasilar atelectasis is present. . Signed by Dr Ferreira Flow    Result Date: 9/28/2021  1. Medial right basilar opacities may be atelectasis or pneumonia. Probable left basilar atelectasis. . Signed by Dr Karyle Muscat         Micro: urine culture pending, attempt to obtain stool for culture. Assessment/Plan   Principal Problem:    GIB (gastrointestinal bleeding)              -GI following    -possible need for EGD/colonscopy              -ct abd/pelvis, possible colitis              -serial HH, stable              -amylase/lipase              -protonix IV              -type and screen, will transfuse if needed   -flagyl     Active Problems:    Hypertension              -continue home medications              -monitor closely       CAD (coronary artery disease)              -noted, hold ASA       Diverticulosis              -noted  UTI              -noted at OSH              -continue rocephin              -repeat UA      Resolved Problems:    * No resolved hospital problems.  *       Antibiotic: rocephin and flagyl    DVT Prophylaxis: on hold for bleeding, SCDs    GI prophylaxis: rian Bautista, APRN - CNP, 9/29/2021 11:16 AM

## 2021-09-30 ENCOUNTER — ANESTHESIA (OUTPATIENT)
Dept: ENDOSCOPY | Age: 81
DRG: 394 | End: 2021-09-30
Payer: MEDICARE

## 2021-09-30 ENCOUNTER — ANESTHESIA EVENT (OUTPATIENT)
Dept: ENDOSCOPY | Age: 81
DRG: 394 | End: 2021-09-30
Payer: MEDICARE

## 2021-09-30 VITALS
DIASTOLIC BLOOD PRESSURE: 52 MMHG | RESPIRATION RATE: 28 BRPM | TEMPERATURE: 97 F | OXYGEN SATURATION: 95 % | SYSTOLIC BLOOD PRESSURE: 113 MMHG

## 2021-09-30 LAB
ALBUMIN SERPL-MCNC: 2.5 G/DL (ref 3.5–5.2)
ALP BLD-CCNC: 134 U/L (ref 35–104)
ALT SERPL-CCNC: 18 U/L (ref 5–33)
ANION GAP SERPL CALCULATED.3IONS-SCNC: 6 MMOL/L (ref 7–19)
AST SERPL-CCNC: 16 U/L (ref 5–32)
ATYPICAL LYMPHOCYTE RELATIVE PERCENT: 1 % (ref 0–8)
BASOPHILS ABSOLUTE: 0 K/UL (ref 0–0.2)
BASOPHILS RELATIVE PERCENT: 0 % (ref 0–1)
BILIRUB SERPL-MCNC: 0.7 MG/DL (ref 0.2–1.2)
BUN BLDV-MCNC: 5 MG/DL (ref 8–23)
CALCIUM SERPL-MCNC: 7.8 MG/DL (ref 8.8–10.2)
CHLORIDE BLD-SCNC: 111 MMOL/L (ref 98–111)
CO2: 23 MMOL/L (ref 22–29)
CREAT SERPL-MCNC: 0.3 MG/DL (ref 0.5–0.9)
EOSINOPHILS ABSOLUTE: 0 K/UL (ref 0–0.6)
EOSINOPHILS RELATIVE PERCENT: 0 % (ref 0–5)
GFR AFRICAN AMERICAN: >59
GFR NON-AFRICAN AMERICAN: >60
GLUCOSE BLD-MCNC: 103 MG/DL (ref 74–109)
HCT VFR BLD CALC: 28.7 % (ref 37–47)
HCT VFR BLD CALC: 29.4 % (ref 37–47)
HCT VFR BLD CALC: 29.8 % (ref 37–47)
HCT VFR BLD CALC: 32.9 % (ref 37–47)
HEMOGLOBIN: 10.1 G/DL (ref 12–16)
HEMOGLOBIN: 11.1 G/DL (ref 12–16)
HEMOGLOBIN: 9.7 G/DL (ref 12–16)
HEMOGLOBIN: 9.9 G/DL (ref 12–16)
IMMATURE GRANULOCYTES #: 0 K/UL
LYMPHOCYTES ABSOLUTE: 0.5 K/UL (ref 1.1–4.5)
LYMPHOCYTES RELATIVE PERCENT: 6 % (ref 20–40)
MAGNESIUM: 1.7 MG/DL (ref 1.6–2.4)
MCH RBC QN AUTO: 32.1 PG (ref 27–31)
MCHC RBC AUTO-ENTMCNC: 33.9 G/DL (ref 33–37)
MCV RBC AUTO: 94.6 FL (ref 81–99)
MONOCYTES ABSOLUTE: 0.3 K/UL (ref 0–0.9)
MONOCYTES RELATIVE PERCENT: 5 % (ref 0–10)
NEUTROPHILS ABSOLUTE: 5.9 K/UL (ref 1.5–7.5)
NEUTROPHILS RELATIVE PERCENT: 88 % (ref 50–65)
PDW BLD-RTO: 12.4 % (ref 11.5–14.5)
PLATELET # BLD: 176 K/UL (ref 130–400)
PLATELET SLIDE REVIEW: ADEQUATE
PMV BLD AUTO: 9.4 FL (ref 9.4–12.3)
POTASSIUM SERPL-SCNC: 2.9 MMOL/L (ref 3.5–5)
POTASSIUM SERPL-SCNC: 3.5 MMOL/L (ref 3.5–5)
POTASSIUM SERPL-SCNC: 4.1 MMOL/L (ref 3.5–5)
RBC # BLD: 3.15 M/UL (ref 4.2–5.4)
RBC # BLD: NORMAL 10*6/UL
REASON FOR REJECTION: NORMAL
REJECTED TEST: NORMAL
SODIUM BLD-SCNC: 140 MMOL/L (ref 136–145)
TOTAL PROTEIN: 4.1 G/DL (ref 6.6–8.7)
WBC # BLD: 6.7 K/UL (ref 4.8–10.8)

## 2021-09-30 PROCEDURE — 85025 COMPLETE CBC W/AUTO DIFF WBC: CPT

## 2021-09-30 PROCEDURE — 2580000003 HC RX 258: Performed by: NURSE ANESTHETIST, CERTIFIED REGISTERED

## 2021-09-30 PROCEDURE — 97165 OT EVAL LOW COMPLEX 30 MIN: CPT

## 2021-09-30 PROCEDURE — 6360000002 HC RX W HCPCS: Performed by: INTERNAL MEDICINE

## 2021-09-30 PROCEDURE — 2580000003 HC RX 258: Performed by: INTERNAL MEDICINE

## 2021-09-30 PROCEDURE — 83735 ASSAY OF MAGNESIUM: CPT

## 2021-09-30 PROCEDURE — 80053 COMPREHEN METABOLIC PANEL: CPT

## 2021-09-30 PROCEDURE — 85014 HEMATOCRIT: CPT

## 2021-09-30 PROCEDURE — 36415 COLL VENOUS BLD VENIPUNCTURE: CPT

## 2021-09-30 PROCEDURE — 6370000000 HC RX 637 (ALT 250 FOR IP): Performed by: STUDENT IN AN ORGANIZED HEALTH CARE EDUCATION/TRAINING PROGRAM

## 2021-09-30 PROCEDURE — 3700000000 HC ANESTHESIA ATTENDED CARE: Performed by: INTERNAL MEDICINE

## 2021-09-30 PROCEDURE — 6370000000 HC RX 637 (ALT 250 FOR IP): Performed by: INTERNAL MEDICINE

## 2021-09-30 PROCEDURE — 6370000000 HC RX 637 (ALT 250 FOR IP): Performed by: NURSE PRACTITIONER

## 2021-09-30 PROCEDURE — 97535 SELF CARE MNGMENT TRAINING: CPT

## 2021-09-30 PROCEDURE — 2709999900 HC NON-CHARGEABLE SUPPLY: Performed by: INTERNAL MEDICINE

## 2021-09-30 PROCEDURE — 0DJ08ZZ INSPECTION OF UPPER INTESTINAL TRACT, VIA NATURAL OR ARTIFICIAL OPENING ENDOSCOPIC: ICD-10-PCS | Performed by: INTERNAL MEDICINE

## 2021-09-30 PROCEDURE — C9113 INJ PANTOPRAZOLE SODIUM, VIA: HCPCS | Performed by: INTERNAL MEDICINE

## 2021-09-30 PROCEDURE — 43235 EGD DIAGNOSTIC BRUSH WASH: CPT | Performed by: INTERNAL MEDICINE

## 2021-09-30 PROCEDURE — 45330 DIAGNOSTIC SIGMOIDOSCOPY: CPT | Performed by: INTERNAL MEDICINE

## 2021-09-30 PROCEDURE — 3609008400 HC SIGMOIDOSCOPY DIAGNOSTIC: Performed by: INTERNAL MEDICINE

## 2021-09-30 PROCEDURE — 0DJD8ZZ INSPECTION OF LOWER INTESTINAL TRACT, VIA NATURAL OR ARTIFICIAL OPENING ENDOSCOPIC: ICD-10-PCS | Performed by: INTERNAL MEDICINE

## 2021-09-30 PROCEDURE — 1210000000 HC MED SURG R&B

## 2021-09-30 PROCEDURE — 3700000001 HC ADD 15 MINUTES (ANESTHESIA): Performed by: INTERNAL MEDICINE

## 2021-09-30 PROCEDURE — 84132 ASSAY OF SERUM POTASSIUM: CPT

## 2021-09-30 PROCEDURE — 6360000002 HC RX W HCPCS: Performed by: STUDENT IN AN ORGANIZED HEALTH CARE EDUCATION/TRAINING PROGRAM

## 2021-09-30 PROCEDURE — 97530 THERAPEUTIC ACTIVITIES: CPT

## 2021-09-30 PROCEDURE — 6360000002 HC RX W HCPCS: Performed by: NURSE ANESTHETIST, CERTIFIED REGISTERED

## 2021-09-30 PROCEDURE — 7100000000 HC PACU RECOVERY - FIRST 15 MIN: Performed by: INTERNAL MEDICINE

## 2021-09-30 PROCEDURE — 2500000003 HC RX 250 WO HCPCS: Performed by: NURSE PRACTITIONER

## 2021-09-30 PROCEDURE — 97162 PT EVAL MOD COMPLEX 30 MIN: CPT

## 2021-09-30 PROCEDURE — 85018 HEMOGLOBIN: CPT

## 2021-09-30 PROCEDURE — 7100000001 HC PACU RECOVERY - ADDTL 15 MIN: Performed by: INTERNAL MEDICINE

## 2021-09-30 PROCEDURE — 3609017100 HC EGD: Performed by: INTERNAL MEDICINE

## 2021-09-30 PROCEDURE — 2500000003 HC RX 250 WO HCPCS: Performed by: INTERNAL MEDICINE

## 2021-09-30 PROCEDURE — 2500000003 HC RX 250 WO HCPCS: Performed by: NURSE ANESTHETIST, CERTIFIED REGISTERED

## 2021-09-30 RX ORDER — LIDOCAINE HYDROCHLORIDE 10 MG/ML
INJECTION, SOLUTION INFILTRATION; PERINEURAL PRN
Status: DISCONTINUED | OUTPATIENT
Start: 2021-09-30 | End: 2021-09-30 | Stop reason: SDUPTHER

## 2021-09-30 RX ORDER — POTASSIUM CHLORIDE 20 MEQ/1
40 TABLET, EXTENDED RELEASE ORAL ONCE
Status: COMPLETED | OUTPATIENT
Start: 2021-09-30 | End: 2021-09-30

## 2021-09-30 RX ORDER — SODIUM CHLORIDE, SODIUM LACTATE, POTASSIUM CHLORIDE, CALCIUM CHLORIDE 600; 310; 30; 20 MG/100ML; MG/100ML; MG/100ML; MG/100ML
INJECTION, SOLUTION INTRAVENOUS CONTINUOUS PRN
Status: DISCONTINUED | OUTPATIENT
Start: 2021-09-30 | End: 2021-09-30 | Stop reason: SDUPTHER

## 2021-09-30 RX ORDER — PROPOFOL 10 MG/ML
INJECTION, EMULSION INTRAVENOUS CONTINUOUS PRN
Status: DISCONTINUED | OUTPATIENT
Start: 2021-09-30 | End: 2021-09-30 | Stop reason: SDUPTHER

## 2021-09-30 RX ORDER — MAGNESIUM SULFATE IN WATER 40 MG/ML
2000 INJECTION, SOLUTION INTRAVENOUS ONCE
Status: COMPLETED | OUTPATIENT
Start: 2021-09-30 | End: 2021-09-30

## 2021-09-30 RX ADMIN — MAGNESIUM SULFATE HEPTAHYDRATE 2000 MG: 40 INJECTION, SOLUTION INTRAVENOUS at 10:36

## 2021-09-30 RX ADMIN — POTASSIUM CHLORIDE 10 MEQ: 10 INJECTION, SOLUTION INTRAVENOUS at 11:49

## 2021-09-30 RX ADMIN — SODIUM CHLORIDE, PRESERVATIVE FREE 10 ML: 5 INJECTION INTRAVENOUS at 15:23

## 2021-09-30 RX ADMIN — METRONIDAZOLE 500 MG: 500 INJECTION, SOLUTION INTRAVENOUS at 16:30

## 2021-09-30 RX ADMIN — LISINOPRIL 5 MG: 5 TABLET ORAL at 08:33

## 2021-09-30 RX ADMIN — METRONIDAZOLE 500 MG: 500 INJECTION, SOLUTION INTRAVENOUS at 01:20

## 2021-09-30 RX ADMIN — PANTOPRAZOLE SODIUM 40 MG: 40 INJECTION, POWDER, FOR SOLUTION INTRAVENOUS at 15:21

## 2021-09-30 RX ADMIN — SODIUM CHLORIDE, PRESERVATIVE FREE 1000 MG: 5 INJECTION INTRAVENOUS at 15:21

## 2021-09-30 RX ADMIN — IRON SUCROSE 200 MG: 20 INJECTION, SOLUTION INTRAVENOUS at 20:46

## 2021-09-30 RX ADMIN — POTASSIUM CHLORIDE 10 MEQ: 10 INJECTION, SOLUTION INTRAVENOUS at 09:24

## 2021-09-30 RX ADMIN — SODIUM CHLORIDE, PRESERVATIVE FREE 10 ML: 5 INJECTION INTRAVENOUS at 04:21

## 2021-09-30 RX ADMIN — POTASSIUM CHLORIDE 10 MEQ: 10 INJECTION, SOLUTION INTRAVENOUS at 17:30

## 2021-09-30 RX ADMIN — SODIUM CHLORIDE: 9 INJECTION, SOLUTION INTRAVENOUS at 01:20

## 2021-09-30 RX ADMIN — Medication 10 ML: at 20:47

## 2021-09-30 RX ADMIN — Medication 1 TABLET: at 08:33

## 2021-09-30 RX ADMIN — CARVEDILOL 3.12 MG: 3.12 TABLET, FILM COATED ORAL at 15:22

## 2021-09-30 RX ADMIN — SODIUM CHLORIDE, SODIUM LACTATE, POTASSIUM CHLORIDE, AND CALCIUM CHLORIDE: 600; 310; 30; 20 INJECTION, SOLUTION INTRAVENOUS at 13:58

## 2021-09-30 RX ADMIN — POTASSIUM CHLORIDE 40 MEQ: 20 TABLET, EXTENDED RELEASE ORAL at 09:16

## 2021-09-30 RX ADMIN — PANTOPRAZOLE SODIUM 40 MG: 40 INJECTION, POWDER, FOR SOLUTION INTRAVENOUS at 04:21

## 2021-09-30 RX ADMIN — METRONIDAZOLE 500 MG: 500 INJECTION, SOLUTION INTRAVENOUS at 08:35

## 2021-09-30 RX ADMIN — POTASSIUM CHLORIDE 10 MEQ: 10 INJECTION, SOLUTION INTRAVENOUS at 10:39

## 2021-09-30 RX ADMIN — POTASSIUM CHLORIDE 10 MEQ: 10 INJECTION, SOLUTION INTRAVENOUS at 08:35

## 2021-09-30 RX ADMIN — PROPOFOL 180 MCG/KG/MIN: 10 INJECTION, EMULSION INTRAVENOUS at 13:59

## 2021-09-30 RX ADMIN — CARVEDILOL 3.12 MG: 3.12 TABLET, FILM COATED ORAL at 08:33

## 2021-09-30 RX ADMIN — ATORVASTATIN CALCIUM 20 MG: 20 TABLET, FILM COATED ORAL at 20:46

## 2021-09-30 RX ADMIN — LIDOCAINE HYDROCHLORIDE 50 MG: 10 INJECTION, SOLUTION INFILTRATION; PERINEURAL at 13:59

## 2021-09-30 RX ADMIN — POTASSIUM CHLORIDE 10 MEQ: 10 INJECTION, SOLUTION INTRAVENOUS at 12:55

## 2021-09-30 ASSESSMENT — ENCOUNTER SYMPTOMS
BACK PAIN: 0
NAUSEA: 0
ABDOMINAL PAIN: 1
VOMITING: 0
SORE THROAT: 0
TROUBLE SWALLOWING: 0
SHORTNESS OF BREATH: 0
BLOOD IN STOOL: 1
DIARRHEA: 1
CHEST TIGHTNESS: 0

## 2021-09-30 NOTE — ANESTHESIA PRE PROCEDURE
Department of Anesthesiology  Preprocedure Note       Name:  Daralyn Canavan   Age:  [de-identified] y.o.  :  1940                                          MRN:  [id number]         Date:  2021      Surgeon: Marques Alejandra):  Fahad Vasquez MD    Procedure: Procedure(s):  EGD DIAGNOSTIC ONLY  COLONOSCOPY DIAGNOSTIC    Medications prior to admission:   Prior to Admission medications    Medication Sig Start Date End Date Taking?  Authorizing Provider   aspirin 325 MG tablet Take 325 mg by mouth daily   Yes Historical Provider, MD   atorvastatin (LIPITOR) 20 MG tablet Take 20 mg by mouth nightly   Yes Historical Provider, MD   carvedilol (COREG) 3.125 MG tablet Take 3.125 mg by mouth 2 times daily (with meals)   Yes Historical Provider, MD   lactobacillus (CULTURELLE) CAPS capsule Take 1 capsule by mouth daily   Yes Historical Provider, MD   famotidine (PEPCID) 20 MG tablet Take 20 mg by mouth 2 times daily   Yes Historical Provider, MD   lisinopril (PRINIVIL;ZESTRIL) 5 MG tablet Take 5 mg by mouth daily   Yes Historical Provider, MD   Omega-3 Fatty Acids (OMEGA 3 PO) Take 1 capsule by mouth 2 times daily   Yes Historical Provider, MD   NONFORMULARY 1 capsule daily [employer]   Yes Historical Provider, MD       Current medications:    Current Facility-Administered Medications   Medication Dose Route Frequency Provider Last Rate Last Admin    metronidazole (FLAGYL) 500 mg in NaCl 100 mL IVPB premix  500 mg IntraVENous Q8H [place] Bodily, APRN - CNP   Stopped at 21 0935    potassium chloride (KLOR-CON M) extended release tablet 40 mEq  40 mEq Oral PRN Esperanza Vargas MD        Or    potassium bicarb-citric acid (EFFER-K) effervescent tablet 40 mEq  40 mEq Oral PRN Esperanza Vargas MD        Or    potassium chloride 10 mEq/100 mL IVPB (Peripheral Line)  10 mEq IntraVENous PRN Esperanza Vargas  mL/hr at 21 1255 10 mEq at 21 1255    bisacodyl (DULCOLAX) EC tablet 10 mg  10 mg Oral Daily PRN Benito Jade MD        atorvastatin (LIPITOR) tablet 20 mg  20 mg Oral Nightly Marizol Merlos MD   20 mg at 09/29/21 2009    carvedilol (COREG) tablet 3.125 mg  3.125 mg Oral BID WC Marizol Merlos MD   3.125 mg at 09/30/21 3206    lisinopril (PRINIVIL;ZESTRIL) tablet 5 mg  5 mg Oral Daily Marizol Merlos MD   5 mg at 09/30/21 4565    therapeutic multivitamin-minerals 1 tablet  1 tablet Oral Daily Marizol Merlos MD   1 tablet at 09/30/21 4653    sodium chloride flush 0.9 % injection 5-40 mL  5-40 mL IntraVENous 2 times per day Marizol Merlos MD        sodium chloride flush 0.9 % injection 5-40 mL  5-40 mL IntraVENous PRN Marizol Merlos MD        0.9 % sodium chloride infusion  25 mL IntraVENous PRN Marizol Merlos MD        ondansetron (ZOFRAN-ODT) disintegrating tablet 4 mg  4 mg Oral Q8H PRN Marizol Merlos MD        Or    on[facility]etron [facility]) injection 4 mg  4 mg IntraVENous Q6H PRN Marizol Merlos MD        acetaminophen (TYLENOL) tablet 650 mg  650 mg Oral Q6H PRN Marizol Merlos MD        Or    acetaminophen (TYLENOL) suppository 650 mg  650 mg Rectal Q6H PRN Marizol Merlos MD        0.9 % sodium chloride infusion   IntraVENous Continuous Merribeth MD Quentin 50 mL/hr at 09/30/21 0834 Rate Change at 09/30/21 0834    pantoprazole (PROTONIX) injection 40 mg  40 mg IntraVENous Q12H Marizol Merlos MD   40 mg at 09/30/21 0421    And    sodium chloride (PF) 0.9 % injection 10 mL  10 mL IntraVENous Q12H Marizol Merlos MD   10 mL at 09/30/21 0421    cefTRIAXone (ROCEPHIN) 1,000 mg in sodium chloride (PF) 10 mL IV syringe  1,000 mg IntraVENous Q24H SAVANAH Lara - CNP   1,000 mg at 09/29/21 1611       Allergies:     Allergies   Allergen Reactions    Adhesive Tape      Redness/bruising    Phenergan [Promethazine]      Hallucinations       Problem List:    Patient Active Problem List   Diagnosis Code    GIB (gastrointestinal bleeding) K92.2    Hypertension I10    CAD (coronary artery disease) I25.10    Diverticulosis K57.90    Colitis K52.9       Past Medical History:        Diagnosis Date    Brain aneurysm 2019    history of brain aneurysm with surgical intervention at [facility] in [facility] CAD (coronary artery disease)     Diverticulitis     H/O heart artery stent     Hypertension        Past Surgical History:        Procedure Laterality Date    CRANIOTOMY      [place]       Social History:    Social History     Tobacco Use    Smoking status: Not on file   Substance Use Topics    Alcohol use:  Yes     Alcohol/week: 7.0 standard drinks     Types: 7 Glasses of wine per week                                Counseling given: Not Answered      Vital Signs (Current):   Vitals:    09/29/21 1836 09/30/21 0120 09/30/21 0645 09/30/21 1245   BP: 112/65 136/62 (!) 126/53 125/64   Pulse: 88 87 78 77   Resp: 16 20 18 18   Temp: 99.7 °F (37.6 °C) 97.5 °F (36.4 °C) 98.6 °F (37 °C) 98.8 °F (37.1 °C)   TempSrc: Temporal Temporal Oral Oral   SpO2: 99% 96% 94% 96%   Weight:                                                  BP Readings from Last 3 Encounters:   09/30/21 125/64       NPO Status:                                                                                 BMI:   Wt Readings from Last 3 Encounters:   09/29/21 163 lb 6 oz (74.1 kg)     There is no height or weight on file to calculate BMI.    CBC:   Lab Results   Component Value Date    WBC 6.7 09/30/2021    RBC 3.15 09/30/2021    HGB 9.7 09/30/2021    HCT 28.7 09/30/2021    MCV 94.6 09/30/2021    RDW 12.4 09/30/2021     09/30/2021       CMP:   Lab Results   Component Value Date     09/30/2021    K 4.1 09/30/2021     09/30/2021    CO2 23 09/30/2021    BUN 5 09/30/2021    CREATININE 0.3 09/30/2021    GFRAA >59 09/30/2021    LABGLOM >60 09/30/2021    GLUCOSE 103 09/30/2021    PROT 4.1 09/30/2021    CALCIUM 7.8 09/30/2021    BILITOT 0.7 09/30/2021    ALKPHOS 134 09/30/2021    AST 16 09/30/2021    ALT 18 09/30/2021       POC Tests: No results for input(s): POCGLU, POCNA, POCK, POCCL, POCBUN, POCHEMO, POCHCT in the last 72 hours. Coags:   Lab Results   Component Value Date    PROTIME 16.0 09/28/2021    INR 1.27 09/28/2021    APTT 34.0 09/28/2021       HCG (If Applicable): No results found for: PREGTESTUR, PREGSERUM, HCG, HCGQUANT     ABGs: No results found for: PHART, PO2ART, TBA9JEM, BED0FZO, BEART, O0THMIJY     Type & Screen (If Applicable):  No results found for: LABABO, LABRH    Drug/Infectious Status (If Applicable):  No results found for: HIV, HEPCAB    COVID-19 Screening (If Applicable): No results found for: COVID19        Anesthesia Evaluation  Patient summary reviewed  Airway: Mallampati: II  TM distance: >3 FB   Neck ROM: full  Mouth opening: > = 3 FB Dental: normal exam         Pulmonary:Negative Pulmonary ROS and normal exam                               Cardiovascular:    (+) hypertension:, CAD:,          Beta Blocker:  Dose within 24 Hrs         Neuro/Psych:   Negative Neuro/Psych ROS              GI/Hepatic/Renal: Neg GI/Hepatic/Renal ROS            Endo/Other: Negative Endo/Other ROS                    Abdominal:             Vascular: negative vascular ROS. Other Findings:             Anesthesia Plan      general and TIVA     ASA 2       Induction: intravenous. Anesthetic plan and risks discussed with patient. Plan discussed with CRNA.     Attending anesthesiologist reviewed and agrees with Preprocedure content              SAVANAH Koch - CRNA   9/30/2021

## 2021-09-30 NOTE — ANESTHESIA POSTPROCEDURE EVALUATION
Department of Anesthesiology  Postprocedure Note    Patient: Alli Thornton  MRN: [id number]  [place]: 1940  Date of evaluation: 9/30/2021  Time:  2:23 PM     Procedure Summary     Date: 09/30/21 Room / Location: [address]    Anesthesia Start: 3878 Anesthesia Stop: 1161    Procedures:       EGD DIAGNOSTIC ONLY (N/A )      SIGMOIDOSCOPY DIAGNOSTIC FLEXIBLE (N/A ) Diagnosis:       GI bleed      (GI bleed)    Surgeons: Kevan Núñez MD Responsible Provider: SAVANAH Jesus CRNA    Anesthesia Type: general, TIVA ASA Status: 2          Anesthesia Type: general, TIVA    Andrew Phase I:      Andrew Phase II:      Last vitals: Reviewed and per EMR flowsheets.        Anesthesia Post Evaluation    Patient location during evaluation: PACU  Patient participation: complete - patient participated  Level of consciousness: responsive to verbal stimuli  Pain score: 0  Airway patency: patent  Nausea & Vomiting: no nausea and no vomiting  Complications: no  Cardiovascular status: hemodynamically stable  Respiratory status: acceptable, spontaneous ventilation and nasal cannula  Hydration status: euvolemic

## 2021-09-30 NOTE — PROGRESS NOTES
[facility]      Patient:  Juanita Darby  YOB: 1940  Date of Service: 9/30/2021  MRN: [id number]   Acct: [de-identified]   Primary Care Physician: No primary care provider on file. Advance Directive: Full Code  Admit Date: 9/28/2021       Hospital Day: 2  Portions of this note have been copied forward, however, changed to reflect the most current clinical status of this patient. CHIEF COMPLAINT GIB    SUBJECTIVE:  Patient states she is very tired and weak today after finishing her colonoscopy prep. CUMULATIVE HOSPITAL COURSE:  The patient is [de-identified] y.o. female who presented to [facility] from OSH ([facility]) complaining of GIB. She originally presented to OSH for N/V/D and abd pain. She stated she was also having some urinary frequency and urgency at the time of admission to [facility] outside hospital patient had drop in hemoglobin from 15.7 to 12.5 with hemoccult positive stool. She stated the nausea and vomiting had improved. She had continued to have moderate sized dark BMs. Patient was transferred to [address] [place] [address] for GI services. Monitoring serial HH with improvement. Rocephin for UTI noted at OSH. Added flagyl as CT indicated possible colitis. GI is following with plans for colonoscopy today. Stool culture obtained and pending. C. Diff negative. Review of Systems:   Review of Systems   Constitutional: Positive for activity change, appetite change (improved) and fatigue. Negative for chills and fever. HENT: Negative for nosebleeds, sore throat and trouble swallowing. Respiratory: Negative for chest tightness and shortness of breath. Cardiovascular: Negative for chest pain and leg swelling. Gastrointestinal: Positive for abdominal pain, blood in stool and diarrhea. Negative for nausea and vomiting. Genitourinary: Negative for difficulty urinating, dysuria, frequency and urgency. Musculoskeletal: Negative for back pain and myalgias.    Neurological: Negative for dizziness, light-headedness and headaches. Psychiatric/Behavioral: Negative for agitation and confusion. 14 point review of systems is negative except as specifically addressed above. Objective:   VITALS:  BP (!) 116/55   Pulse 82   Temp 98.1 °F (36.7 °C) (Temporal)   Resp 16   Wt 163 lb 6 oz (74.1 kg)   SpO2 94%   24HR INTAKE/OUTPUT:    Intake/Output Summary (Last 24 hours) at 9/30/2021 1434  Last data filed at 9/30/2021 1421  Gross per 24 hour   Intake 300 ml   Output --   Net 300 ml       Physical Exam  Constitutional:       Appearance: She is not ill-appearing. HENT:      Head: Normocephalic. Nose: Nose normal.      Mouth/Throat:      Mouth: Mucous membranes are moist.      Pharynx: Oropharynx is clear. Eyes:      Extraocular Movements: Extraocular movements intact. Pupils: Pupils are equal, round, and reactive to light. Cardiovascular:      Rate and Rhythm: Normal rate and regular rhythm. Pulses: Normal pulses. Heart sounds: Normal heart sounds. Pulmonary:      Effort: Pulmonary effort is normal.      Breath sounds: Normal breath sounds. Abdominal:      General: Abdomen is flat. Bowel sounds are normal. There is no distension. Palpations: Abdomen is soft. Tenderness: There is abdominal tenderness (LLQ). There is no guarding or rebound. Musculoskeletal:         General: Normal range of motion. Cervical back: Normal range of motion and neck supple. Right lower leg: No edema. Left lower leg: No edema. Skin:     General: Skin is warm and dry. Capillary Refill: Capillary refill takes less than 2 seconds. Coloration: Skin is pale. Neurological:      General: No focal deficit present. Mental Status: She is alert and oriented to person, place, and time.          Medications:      [MAR Hold] sodium chloride      [MAR Hold] sodium chloride 50 mL/hr at 09/30/21 0834      [MAR Hold] metroNIDAZOLE  500 mg IntraVENous Q8H    [MAR Hold] atorvastatin  20 mg Oral Nightly    [MAR Hold] carvedilol  3.125 mg Oral BID WC    [MAR Hold] lisinopril  5 mg Oral Daily    [MAR Hold] therapeutic multivitamin-minerals  1 tablet Oral Daily    [MAR Hold] sodium chloride flush  5-40 mL IntraVENous 2 times per day    [MAR Hold] pantoprazole  40 mg IntraVENous Q12H    And    [MAR Hold] sodium chloride (PF)  10 mL IntraVENous Q12H    [MAR Hold] cefTRIAXone (ROCEPHIN) IV  1,000 mg IntraVENous Q24H     [MAR Hold] potassium chloride **OR** [MAR Hold] potassium alternative oral replacement **OR** [MAR Hold] potassium chloride, [MAR Hold] bisacodyl, [MAR Hold] sodium chloride flush, [MAR Hold] sodium chloride, [MAR Hold] ondansetron **OR** [MAR Hold] ondansetron, [MAR Hold] acetaminophen **OR** [MAR Hold] acetaminophen  Diet NPO     Lab and other Data:     Recent Labs     09/28/21  1546 09/28/21  2057 09/29/21  0854 09/29/21  1555 09/30/21  0308 09/30/21  0609 09/30/21  1135   WBC 9.9  --  9.3  --   --  6.7  --    HGB 12.0   < > 11.8*  11.9*   < > 9.9* 10.1* 9.7*     --  223  --   --  176  --     < > = values in this interval not displayed. Recent Labs     09/28/21  1546 09/28/21  1546 09/29/21  0318 09/30/21  0609 09/30/21  1307     --  138 140  --    K 3.5   < > 3.2* 2.9* 4.1     --  109 111  --    CO2 23  --  22 23  --    BUN 19  --  13 5*  --    CREATININE 0.5  --  0.4* 0.3*  --    GLUCOSE 93  --  103 103  --     < > = values in this interval not displayed. Recent Labs     09/28/21  1546 09/29/21  0318 09/30/21  0609   AST 18 20 16   ALT 17 19 18   BILITOT 0.8 0.6 0.7   ALKPHOS 211* 169* 134*     Troponin T: No results for input(s): TROPONINI in the last 72 hours. Pro-BNP: No results for input(s): BNP in the last 72 hours.   INR:   Recent Labs     09/28/21  1546   INR 1.27*     UA:  Recent Labs     09/28/21  1635   COLORU YELLOW   PHUR 5.0   WBCUA 2-4   RBCUA 2-4   BACTERIA Rare*   CLARITYU Clear   SPECGRAV 1.022   LEUKOCYTESUR Negative   UROBILINOGEN 1.0   BILIRUBINUR Negative   BLOODU TRACE*   GLUCOSEU Negative     A1C:   Recent Labs     09/28/21  1546   LABA1C 4.7     ABG:No results for input(s): PHART, JXO0CUO, PO2ART, YXY2DMZ, BEART, HGBAE, B6HIWQIE, CARBOXHGBART in the last 72 hours. RAD:   CT ABDOMEN PELVIS WO CONTRAST Additional Contrast? None    Result Date: 9/28/2021  1. There is a segmental colitis involving the descending and sigmoid colon with circumferential thickening of the colonic wall and pericolonic inflammatory stranding. This is likely infectious. There is free fluid in the perihepatic and perisplenic space as well as within the gutters and pelvis. This may be reactive in nature. 2. There is a cystic mass within the anterior left pelvis which I suspect is related to the left ovary. An epithelial neoplasm of the left ovary is not excluded and this would warrant gynecologic consultation and further imaging characterization. The uterus and right adnexa are unremarkable. 3. Fat-containing periumbilical hernia. 4. Heavy atheromatous calcification of the abdominal aorta and iliac arteries with no evidence of aneurysm. 5. No evidence of nephrolithiasis or obstructive uropathy. 6. Small right effusion. Bibasilar atelectasis is present. . Signed by Dr Summer Merchant    Result Date: 9/28/2021  1. Medial right basilar opacities may be atelectasis or pneumonia. Probable left basilar atelectasis. . Signed by Dr Barth Ban: 9/30/2021  7:29 AM - Sean Roth Incoming Lab Results From [facility]    Specimen Information: Stool        Component Collected Lab   Campylobacter Antigen 09/29/2021 11:44 AM  - [facility]   Campylobacter Antigen EIA not detected   Normal Range: Not detected      C.diff Toxin/Antigen  Clostridium Difficile Toxin/Antigen  Collected: 09/29/21 1144   Result status: Final   Resulting lab: [facility]   Value: Result: Negative for Toxigenic C. difficile by EIA          Assessment/Plan   Principal Problem:    GIB (gastrointestinal bleeding)              -GI following                          -plan for colonoscopy today              -ct abd/pelvis, possible colitis              -serial HH, stable              -amylase/lipase              -protonix IV              -type and screen, will transfuse if needed              -flagyl and rocephin     Active Problems:    Hypertension              -continue home medications              -monitor closely       CAD (coronary artery disease)              -noted, hold ASA       Diverticulosis              -noted  UTI              -noted at OSH              -continue rocephin              -repeat UA      Resolved Problems:    * No resolved hospital problems.  *        Antibiotic: rocephin and flagyl     DVT Prophylaxis: on hold for bleeding, SCDs     GI prophylaxis:  protonix    Narvis Forrest, APRN - CNP, 9/30/2021 2:34 PM

## 2021-09-30 NOTE — BRIEF OP NOTE
Brief Postoperative Note      Patient: Romi Philip  YOB: 1940  MRN: [id number]    Date of Procedure: 9/30/2021    Pre-Op Diagnosis: GI bleed    Post-Op Diagnosis:  EGD Normal, severe ischemic colitis of descending colon       Procedure(s):  EGD DIAGNOSTIC ONLY  SIGMOIDOSCOPY DIAGNOSTIC FLEXIBLE    Surgeon(s):  Cristobal Wilburn MD    Assistant:  Werner Dorman MD    Anesthesia: Monitor Anesthesia Care    Estimated Blood Loss (mL): none    Complications:  none    Specimens:   * No specimens in log *    Implants:  * No implants in log *      Drains: * No LDAs found *    Findings: severe ischemic colitis                                                                                                                               Plan:  Cipro, Flagyl, clear liquid diet, monitor abdomen for acute change, daily CBC, KUB as needed    Electronically signed by Cristobal Wilburn MD on 9/30/2021 at 2:31 PM

## 2021-09-30 NOTE — PROGRESS NOTES
Physical Therapy    Facility/Department: [facility] 3 RIZWAN/VAS/MED  Initial Assessment    NAME: Saad Patterson  : 1940  MRN: [id number]    Date of Service: 2021    Discharge Recommendations:  Continue to assess pending progress, 24 hour supervision or assist, Patient would benefit from continued therapy after discharge        Assessment   Body structures, Functions, Activity limitations: Decreased functional mobility ; Decreased ROM; Decreased strength;Decreased balance;Decreased safe awareness;Decreased coordination;Decreased posture  Assessment: Pt. will benefit from cont. PT to decrease impairments. Pt. a fall risk and should not attempt mobility on her own at this time. Would recommend that pt use gait belt, have staff A and use RW for amb. Anticipate pt will need additional therapy prior to d/c home to increase strength and indep mobility. Treatment Diagnosis: impaired gait and mobility  Prognosis: Good  Decision Making: Medium Complexity  PT Education: Goals;PT Role;Plan of Care;Transfer Training;Functional Mobility Training;Gait Training;General Safety  Patient Education: use of call light for staff A  Barriers to Learning: none noted  REQUIRES PT FOLLOW UP: Yes  Activity Tolerance  Activity Tolerance: Other  Activity Tolerance: limited by 2 IVs and 1 of them leaking       Patient Diagnosis(es): There were no encounter diagnoses. has a past medical history of Brain aneurysm, CAD (coronary artery disease), Diverticulitis, H/O heart artery stent, and Hypertension. has a past surgical history that includes craniotomy.     Restrictions  Restrictions/Precautions  Restrictions/Precautions: Fall Risk  Required Braces or Orthoses?: No  Vision/Hearing  Vision: Within Functional Limits  Hearing: Within functional limits     Subjective  General  Chart Reviewed: Yes  Patient assessed for rehabilitation services?: Yes  Response To Previous Treatment: Not applicable  Family / Caregiver Present: No  Referring Practitioner: Christopher Mendez MD  Referral Date : 09/30/21  Diagnosis: GIB  Follows Commands: Within Functional Limits  General Comment  Comments: RN, Luke Aaron PT and alerted that pt's IV on RUE was leaking. Subjective  Subjective: Pt. states she just got up to go to the bathroom with nursing. States she has an IV in each arm. C/o some burning from potassium infusion, but no real pain. Pain Screening  Patient Currently in Pain: No  Vital Signs  Patient Currently in Pain: No  Pre Treatment Pain Screening  Intervention List: Patient able to continue with treatment    Orientation  Orientation  Overall Orientation Status: Within Functional Limits  Social/Functional History  Social/Functional History  Lives With: Family  Type of Home: House  Bathroom Shower/Tub: Tub/Shower unit  Home Equipment: Cane  ADL Assistance: Independent  Homemaking Assistance: Independent  Ambulation Assistance: Independent  Transfer Assistance: Independent  Additional Comments: pt most recently at [address] [place] [address] for therapy  Cognition   Cognition  Overall Cognitive Status: Exceptions  Arousal/Alertness: Appropriate responses to stimuli  Following Commands:  Follows one step commands consistently  Attention Span: Attends with cues to redirect  Memory: Appears intact  Safety Judgement: Decreased awareness of need for assistance;Decreased awareness of need for safety  Insights: Decreased awareness of deficits  Initiation: Requires cues for some  Sequencing: Requires cues for some    Objective     Observation/Palpation  Observation: IVs-1 in each arm    AROM RLE (degrees)  RLE AROM: WFL  AROM LLE (degrees)  LLE AROM : Exceptions  LLE General AROM: ankle and hip WFLs, knee does not fully extend-lacking about 20 deg and with valgus appearing deformity  Strength RLE  Strength RLE: WFL  Comment: grossly 4/5  Strength LLE  Strength LLE: Exception  Comment: knee grossly 4-/5 within available range at knee, 4/5 ankle and hip        Bed mobility  Supine to Sit: Minimal assistance (to manage LLE)  Scooting: Stand by assistance  Comment: HOB elevated; bed rails utilized; extra time to complete  Transfers  Sit to Stand: Minimal Assistance  Stand to sit: Minimal Assistance  Bed to Chair: Minimal assistance  Ambulation  Ambulation?: Yes  Ambulation 1  Surface: level tile  Device: Hand-Held Assist  Assistance: Minimal assistance  Quality of Gait: unsteady due to LLE deformity  Gait Deviations: Slow Leelee;Decreased step length;Decreased step height  Distance: 3'  Comments: limited by 2 IVs and RN notified that one on RUE leaking     Balance  Posture: Fair  Sitting - Static: Good;-  Sitting - Dynamic: Fair;+  Standing - Static: Poor;+  Standing - Dynamic: Poor;+        Plan   Plan  Times per week: 3-7  Plan weeks: 2  Current Treatment Recommendations: Strengthening, ROM, Balance Training, Functional Mobility Training, Transfer Training, Gait Training, Safety Education & Training, Positioning, Equipment Evaluation, Education, & procurement, Patient/Caregiver Education & Training  Plan Comment: cont. PT per POC.   Safety Devices  Type of devices: Gait belt, Patient at risk for falls, Nurse notified, Call light within reach, Left in chair, Chair alarm in place (BLEs elevated)    G-Code       OutComes Score                                                  AM-PAC Score             Goals  Short term goals  Time Frame for Short term goals: 2 wks  Short term goal 1: supine to sit indep  Short term goal 2: sit to stand indep  Short term goal 3: amb. 200' indep with RW  Patient Goals   Patient goals : get stonger, feel better       Therapy Time   Individual Concurrent Group Co-treatment   Time In           Time Out           Minutes                   Dirk Fagan PT       Electronically signed by Dirk Fagan PT on 9/30/2021 at 12:16 PM

## 2021-09-30 NOTE — OP NOTE
[facility] ROBERT [address] [facility]                                OPERATIVE REPORT    PATIENT NAME: Rajesh Farmer                     :        1940  MED REC NO:   [id number]                              ROOM:       [facility]  ACCOUNT NO:   [de-identified]                           ADMIT DATE: 2021  PROVIDER:     Ivet Odonnell MD    DATE OF PROCEDURE:  2021    PROCEDURE:  Limited colonoscopy (flexible sigmoidoscopy). INDICATION:  An 51-year-old white female was admitted with sudden onset  of abdominal pain, nausea, vomiting, and bloody diarrhea. A CAT scan of  the abdomen is suspicious for segmental colitis in the descending colon  and splenic flexure. A colonoscopy is done to evaluate significance. PREMEDICATION:  Propofol per anesthetist.    OPERATIVE PROCEDURE:  Gloved rectal exam was normal.  No internal rectal  mass was palpable. The Olympus full-length video colonoscope was  inserted into the rectum and air insufflation completed. The rectal  vault had normal mucosal lining therefore excluding ulcerative colitis. The sigmoid colon had a few very small diverticula. There was a  distinct change at 40 cm from the anal verge where there was clear  demarcation from normal colonic mucosa to ischemic-appearing mucosa with  serpiginous ulcerations, swelling, and a couple of dark blue blebs. I felt that it was unwise  to pass the scope any further because of  potential for bowel necrosis. The bowel was deflated and the scope  removed. The patient tolerated the procedure well. EBL none. FINDINGS:  Ischemic colitis starting at 40 cm from the anal verge  involving the descending colon and splenic flexure, but sparing the  distal rectosigmoid colon.     PLAN:  Empirical treatment with Cipro and Flagyl IV, monitor for fever  and acute abdominal changes or peritoneal signs, clear liquid

## 2021-09-30 NOTE — OP NOTE
[facility] VANESSA [address] [facility]                                OPERATIVE REPORT    PATIENT NAME: Regina Varela                     :        1940  MED REC NO:   [id number]                              ROOM:       [facility]  ACCOUNT NO:   [de-identified]                           ADMIT DATE: 2021  PROVIDER:     Mary Kate Hanson MD    DATE OF PROCEDURE:  2021    PROCEDURE:  EGD. INDICATION:  An 51-year-old white female was admitted with acute  abdominal pain, nausea, vomiting, and passage of blood rectally. EGD is  done to rule out an upper GI source of bleeding. PREMEDICATION:  Propofol per anesthetist.    OPERATIVE PROCEDURE:  The Olympus video endoscope was passed by mouth. The entire length of the esophagus had a normal mucosal lining down to  the GE junction at 38 cm. The stomach was insufflated with air. All  gastric surfaces appeared normal.  The pylorus was patent. The duodenum  was normal.  There was no new or old blood in the upper GI tract. The  scope was removed. The patient tolerated the procedure well. EBL None. FINDINGS:  Normal esophagus, stomach, and duodenum.         Crow Lopez MD    D: 2021 15:52:39      T: 2021 15:56:17     [email or web address]  Job#: [id number]     Doc#: [id number]    CC:

## 2021-10-01 LAB
ALBUMIN SERPL-MCNC: 2.7 G/DL (ref 3.5–5.2)
ANION GAP SERPL CALCULATED.3IONS-SCNC: 7 MMOL/L (ref 7–19)
BASOPHILS ABSOLUTE: 0 K/UL (ref 0–0.2)
BASOPHILS RELATIVE PERCENT: 0.7 % (ref 0–1)
BUN BLDV-MCNC: 5 MG/DL (ref 8–23)
CALCIUM SERPL-MCNC: 7.6 MG/DL (ref 8.8–10.2)
CHLORIDE BLD-SCNC: 110 MMOL/L (ref 98–111)
CO2: 23 MMOL/L (ref 22–29)
CREAT SERPL-MCNC: 0.3 MG/DL (ref 0.5–0.9)
EOSINOPHILS ABSOLUTE: 0.2 K/UL (ref 0–0.6)
EOSINOPHILS RELATIVE PERCENT: 3.8 % (ref 0–5)
GFR AFRICAN AMERICAN: >59
GFR NON-AFRICAN AMERICAN: >60
GLUCOSE BLD-MCNC: 101 MG/DL (ref 74–109)
HCT VFR BLD CALC: 30.3 % (ref 37–47)
HCT VFR BLD CALC: 30.9 % (ref 37–47)
HCT VFR BLD CALC: 34.6 % (ref 37–47)
HEMOGLOBIN: 10.2 G/DL (ref 12–16)
HEMOGLOBIN: 10.5 G/DL (ref 12–16)
HEMOGLOBIN: 11.5 G/DL (ref 12–16)
IMMATURE GRANULOCYTES #: 0.1 K/UL
LYMPHOCYTES ABSOLUTE: 1.3 K/UL (ref 1.1–4.5)
LYMPHOCYTES RELATIVE PERCENT: 20.9 % (ref 20–40)
MAGNESIUM: 1.8 MG/DL (ref 1.6–2.4)
MCH RBC QN AUTO: 32.2 PG (ref 27–31)
MCHC RBC AUTO-ENTMCNC: 34 G/DL (ref 33–37)
MCV RBC AUTO: 94.8 FL (ref 81–99)
MONOCYTES ABSOLUTE: 0.6 K/UL (ref 0–0.9)
MONOCYTES RELATIVE PERCENT: 10.3 % (ref 0–10)
NEUTROPHILS ABSOLUTE: 3.8 K/UL (ref 1.5–7.5)
NEUTROPHILS RELATIVE PERCENT: 62.7 % (ref 50–65)
PDW BLD-RTO: 12.4 % (ref 11.5–14.5)
PLATELET # BLD: 226 K/UL (ref 130–400)
PMV BLD AUTO: 9.9 FL (ref 9.4–12.3)
POTASSIUM SERPL-SCNC: 3.4 MMOL/L (ref 3.5–5)
RBC # BLD: 3.26 M/UL (ref 4.2–5.4)
SODIUM BLD-SCNC: 140 MMOL/L (ref 136–145)
WBC # BLD: 6.1 K/UL (ref 4.8–10.8)

## 2021-10-01 PROCEDURE — 85018 HEMOGLOBIN: CPT

## 2021-10-01 PROCEDURE — 85014 HEMATOCRIT: CPT

## 2021-10-01 PROCEDURE — 6370000000 HC RX 637 (ALT 250 FOR IP): Performed by: INTERNAL MEDICINE

## 2021-10-01 PROCEDURE — 6360000002 HC RX W HCPCS: Performed by: STUDENT IN AN ORGANIZED HEALTH CARE EDUCATION/TRAINING PROGRAM

## 2021-10-01 PROCEDURE — 2500000003 HC RX 250 WO HCPCS: Performed by: INTERNAL MEDICINE

## 2021-10-01 PROCEDURE — 6360000002 HC RX W HCPCS: Performed by: INTERNAL MEDICINE

## 2021-10-01 PROCEDURE — 97116 GAIT TRAINING THERAPY: CPT

## 2021-10-01 PROCEDURE — 2580000003 HC RX 258: Performed by: INTERNAL MEDICINE

## 2021-10-01 PROCEDURE — 6370000000 HC RX 637 (ALT 250 FOR IP): Performed by: STUDENT IN AN ORGANIZED HEALTH CARE EDUCATION/TRAINING PROGRAM

## 2021-10-01 PROCEDURE — 83735 ASSAY OF MAGNESIUM: CPT

## 2021-10-01 PROCEDURE — 80048 BASIC METABOLIC PNL TOTAL CA: CPT

## 2021-10-01 PROCEDURE — 1210000000 HC MED SURG R&B

## 2021-10-01 PROCEDURE — 85025 COMPLETE CBC W/AUTO DIFF WBC: CPT

## 2021-10-01 PROCEDURE — C9113 INJ PANTOPRAZOLE SODIUM, VIA: HCPCS | Performed by: INTERNAL MEDICINE

## 2021-10-01 PROCEDURE — 36415 COLL VENOUS BLD VENIPUNCTURE: CPT

## 2021-10-01 PROCEDURE — 97530 THERAPEUTIC ACTIVITIES: CPT

## 2021-10-01 PROCEDURE — 82040 ASSAY OF SERUM ALBUMIN: CPT

## 2021-10-01 RX ORDER — POTASSIUM CHLORIDE 750 MG/1
40 TABLET, EXTENDED RELEASE ORAL ONCE
Status: COMPLETED | OUTPATIENT
Start: 2021-10-01 | End: 2021-10-01

## 2021-10-01 RX ADMIN — CARVEDILOL 3.12 MG: 3.12 TABLET, FILM COATED ORAL at 08:36

## 2021-10-01 RX ADMIN — SODIUM CHLORIDE: 9 INJECTION, SOLUTION INTRAVENOUS at 20:28

## 2021-10-01 RX ADMIN — PANTOPRAZOLE SODIUM 40 MG: 40 INJECTION, POWDER, FOR SOLUTION INTRAVENOUS at 03:46

## 2021-10-01 RX ADMIN — IRON SUCROSE 200 MG: 20 INJECTION, SOLUTION INTRAVENOUS at 08:36

## 2021-10-01 RX ADMIN — LISINOPRIL 5 MG: 5 TABLET ORAL at 08:36

## 2021-10-01 RX ADMIN — SODIUM CHLORIDE, PRESERVATIVE FREE 10 ML: 5 INJECTION INTRAVENOUS at 03:46

## 2021-10-01 RX ADMIN — METRONIDAZOLE 500 MG: 500 INJECTION, SOLUTION INTRAVENOUS at 01:08

## 2021-10-01 RX ADMIN — ATORVASTATIN CALCIUM 20 MG: 20 TABLET, FILM COATED ORAL at 20:28

## 2021-10-01 RX ADMIN — Medication 3 ML: at 20:27

## 2021-10-01 RX ADMIN — METRONIDAZOLE 500 MG: 500 INJECTION, SOLUTION INTRAVENOUS at 10:32

## 2021-10-01 RX ADMIN — POTASSIUM CHLORIDE 40 MEQ: 750 TABLET, EXTENDED RELEASE ORAL at 08:35

## 2021-10-01 RX ADMIN — METRONIDAZOLE 500 MG: 500 INJECTION, SOLUTION INTRAVENOUS at 16:38

## 2021-10-01 RX ADMIN — SODIUM CHLORIDE, PRESERVATIVE FREE 1000 MG: 5 INJECTION INTRAVENOUS at 16:38

## 2021-10-01 RX ADMIN — Medication 1 TABLET: at 08:36

## 2021-10-01 RX ADMIN — CARVEDILOL 3.12 MG: 3.12 TABLET, FILM COATED ORAL at 16:38

## 2021-10-01 ASSESSMENT — ENCOUNTER SYMPTOMS
BLOOD IN STOOL: 0
SHORTNESS OF BREATH: 0
TROUBLE SWALLOWING: 0
CHEST TIGHTNESS: 0
NAUSEA: 0
SORE THROAT: 0
BACK PAIN: 0
DIARRHEA: 0
ABDOMINAL PAIN: 0
VOMITING: 0

## 2021-10-01 NOTE — PROGRESS NOTES
Occupational Therapy     10/01/21 1231   Restrictions/Precautions   Restrictions/Precautions Fall Risk   Required Braces or Orthoses? No   Vision   Vision [facility]   Hearing   Hearing [facility]   General   Chart Reviewed Yes   Patient assessed for rehabilitation services? Yes   Response to previous treatment Patient with no complaints from previous session   Family / Caregiver Present No   Subjective   Subjective Pt up in recliner upon arrival for therapy. Pt agreeable to participate. Pain Assessment   Patient Currently in Pain No   ADL   Feeding Independent;Setup   Grooming Independent;Setup   UE Bathing Supervision   LE Bathing Supervision   UE Dressing Supervision   LE Dressing Supervision;Stand by assistance   Toileting Supervision;Stand by assistance   Balance   Sitting Balance Independent   Standing Balance Stand by assistance   Functional Mobility   Functional - Mobility Device Rolling Walker   Activity Other  (to window and door in room x2)   Assist Level Contact guard assistance   Functional Mobility Comments CGA/ SBA for functional mobility. Toilet Transfers   Toilet - Technique Stand step   Equipment Used Standard bedside commode   Toilet Transfer Supervision;Contact guard assistance   Toilet Transfers Comments reports using BSC from recliner with independence this date. Assessment   Performance deficits / Impairments Decreased functional mobility ; Decreased ADL status; Decreased strength;Decreased balance;Decreased endurance;Decreased high-level IADLs   Assessment Lives at home with family. Plans on going to [facility] for continued therapy for improved ADL function and functional mobility. Prognosis Good   REQUIRES OT FOLLOW UP Yes   Treatment Initiated  Tx focused on functional mobility at RW level in room and showing safety with BSC use.     Discharge Recommendations Patient would benefit from continued therapy after discharge   Activity Tolerance   Activity Tolerance Patient Tolerated treatment well Safety Devices   Safety Devices in place Yes   Type of devices Call light within reach; Chair alarm in place; Left in chair;Nurse notified   Plan   Times per week 3-5   Times per day Daily   Electronically signed by NICKIE Hester on 10/1/2021 at 12:40 PM

## 2021-10-01 NOTE — PROGRESS NOTES
Physical Therapy  Viola Marquez  [id number]     10/01/21 1226   Subjective   Subjective Patient up in chair and agrees to work with therapy. Transfers   Sit to Stand Contact guard assistance   Stand to sit Contact guard assistance   Ambulation   Ambulation? Yes   Ambulation 1   Surface level tile   Device Rolling Walker   Other Apparatus   (IV)   Assistance Contact guard assistance   Quality of Gait steady, no LOB noted   Distance 36'   Comments patient ambulated in room   Other Activities   Comment Patient did well with therapy. Patient agreed to ambulate short distance in room secondary to not wanting to be far from the BR. Patient returned to recliner, positioned for comfort with all needs in reach. Short term goals   Time Frame for Short term goals 2 wks   Short term goal 1 supine to sit indep   Short term goal 2 sit to stand indep   Short term goal 3 amb. 200' indep with RW   Activity Tolerance   Activity Tolerance Patient Tolerated treatment well   Safety Devices   Type of devices Call light within reach; Left in chair   Electronically signed by Blu Chester PTA on 10/1/2021 at 12:30 PM

## 2021-10-01 NOTE — CARE COORDINATION
HEATHER faxed updated clinicals to [facility] as requested  [address]  [phone] p  [phone] or [phone]    Electronically signed by JONATHAN Melo on 10/1/2021 at 11:55 AM

## 2021-10-01 NOTE — PROGRESS NOTES
[facility] Hospitalists      Patient:  Nilton Wray  YOB: 1940  Date of Service: 10/1/2021  MRN: [id number]   Acct: [de-identified]   Primary Care Physician: No primary care provider on file. Advance Directive: Full Code  Admit Date: 9/28/2021       Hospital Day: 3  Portions of this note have been copied forward, however, changed to reflect the most current clinical status of this patient. CHIEF COMPLAINT GIB    SUBJECTIVE:  Patient is hungry this morning. She states her diarrhea has improved, she denies nausea or pain. CUMULATIVE HOSPITAL COURSE:  The patient is [de-identified] y.o. female who presented to [facility] from OSH ([facility]) complaining of GIB. She originally presented to OSH for N/V/D and abd pain. She stated she was also having some urinary frequency and urgency at the time of admission to [facility] outside hospital patient had drop in hemoglobin from 15.7 to 12.5 with hemoccult positive stool. She stated the nausea and vomiting had improved. She had continued to have moderate sized dark BMs. Patient was transferred to [facility] for GI services. Monitoring serial HH with improvement. Rocephin for UTI noted at OSH. Added flagyl as CT indicated possible colitis. Colonoscopy indicated severe ischemic colitis. EGD normal. GI recommends continuing IV antibiotics and clear liquid diet at this time. Will obtain KUB if symptoms worsen. Review of Systems:   Review of Systems   Constitutional: Positive for activity change and fatigue. Negative for appetite change (improved), chills and fever. HENT: Negative for nosebleeds, sore throat and trouble swallowing. Respiratory: Negative for chest tightness and shortness of breath. Cardiovascular: Negative for chest pain and leg swelling. Gastrointestinal: Negative for abdominal pain, blood in stool, diarrhea, nausea and vomiting. Genitourinary: Negative for difficulty urinating, dysuria, frequency and urgency.    Musculoskeletal: Negative for back pain and myalgias. Neurological: Negative for dizziness, light-headedness and headaches. Psychiatric/Behavioral: Negative for agitation and confusion. 14 point review of systems is negative except as specifically addressed above. Objective:   VITALS:  BP (!) 142/70   Pulse 82   Temp 99.2 °F (37.3 °C) (Temporal)   Resp 18   Wt 163 lb 6 oz (74.1 kg)   SpO2 91%   24HR INTAKE/OUTPUT:    Intake/Output Summary (Last 24 hours) at 10/1/2021 1103  Last data filed at 9/30/2021 1830  Gross per 24 hour   Intake 920 ml   Output --   Net 920 ml       Physical Exam  Constitutional:       Appearance: She is not ill-appearing. HENT:      Head: Normocephalic. Nose: Nose normal.      Mouth/Throat:      Mouth: Mucous membranes are moist.      Pharynx: Oropharynx is clear. Eyes:      Extraocular Movements: Extraocular movements intact. Pupils: Pupils are equal, round, and reactive to light. Cardiovascular:      Rate and Rhythm: Normal rate and regular rhythm. Pulses: Normal pulses. Heart sounds: Normal heart sounds. Pulmonary:      Effort: Pulmonary effort is normal.      Breath sounds: Normal breath sounds. Abdominal:      General: Abdomen is flat. Bowel sounds are normal. There is no distension. Palpations: Abdomen is soft. Tenderness: There is no abdominal tenderness (LLQ). There is no guarding or rebound. Musculoskeletal:         General: Normal range of motion. Cervical back: Normal range of motion and neck supple. Right lower leg: No edema. Left lower leg: No edema. Skin:     General: Skin is warm and dry. Capillary Refill: Capillary refill takes less than 2 seconds. Coloration: Skin is pale. Neurological:      General: No focal deficit present. Mental Status: She is alert and oriented to person, place, and time.          Medications:      sodium chloride      sodium chloride 50 mL/hr at 09/30/21 0834      metroNIDAZOLE  500 mg IntraVENous Q8H    atorvastatin  20 mg Oral Nightly    carvedilol  3.125 mg Oral BID WC    lisinopril  5 mg Oral Daily    therapeutic multivitamin-minerals  1 tablet Oral Daily    sodium chloride flush  5-40 mL IntraVENous 2 times per day    cefTRIAXone (ROCEPHIN) IV  1,000 mg IntraVENous Q24H     potassium chloride **OR** potassium alternative oral replacement **OR** potassium chloride, bisacodyl, sodium chloride flush, sodium chloride, ondansetron **OR** ondansetron, acetaminophen **OR** acetaminophen  ADULT DIET; Clear Liquid  Adult Oral Nutrition Supplement; Standard High Calorie/High Protein Oral Supplement     Lab and other Data:     Recent Labs     09/29/21  0854 09/29/21  1555 09/30/21  0609 09/30/21  0609 09/30/21  1135 09/30/21  1912 10/01/21  0316   WBC 9.3  --  6.7  --   --   --  6.1   HGB 11.8*  11.9*   < > 10.1*   < > 9.7* 11.1* 10.5*     --  176  --   --   --  226    < > = values in this interval not displayed. Recent Labs     09/29/21  0318 09/29/21  0318 09/30/21  0609 09/30/21  0609 09/30/21  1307 09/30/21  1912 10/01/21  0316     --  140  --   --   --  140   K 3.2*   < > 2.9*   < > 4.1 3.5 3.4*     --  111  --   --   --  110   CO2 22  --  23  --   --   --  23   BUN 13  --  5*  --   --   --  5*   CREATININE 0.4*  --  0.3*  --   --   --  0.3*   GLUCOSE 103  --  103  --   --   --  101    < > = values in this interval not displayed. Recent Labs     09/28/21  1546 09/29/21  0318 09/30/21  0609   AST 18 20 16   ALT 17 19 18   BILITOT 0.8 0.6 0.7   ALKPHOS 211* 169* 134*     Troponin T: No results for input(s): TROPONINI in the last 72 hours. Pro-BNP: No results for input(s): BNP in the last 72 hours.   INR:   Recent Labs     09/28/21  1546   INR 1.27*     UA:  Recent Labs     09/28/21  1635   COLORU YELLOW   PHUR 5.0   WBCUA 2-4   RBCUA 2-4   BACTERIA Rare*   CLARITYU Clear   SPECGRAV 1.022   LEUKOCYTESUR Negative   UROBILINOGEN 1.0   BILIRUBINUR Negative   BLOODU TRACE*   GLUCOSEU Negative     A1C:   Recent Labs     09/28/21  1546   LABA1C 4.7     ABG:No results for input(s): PHART, ASD0PSC, PO2ART, BLR1JZR, BEART, HGBAE, L7TYMGZH, CARBOXHGBART in the last 72 hours. RAD:   CT ABDOMEN PELVIS WO CONTRAST Additional Contrast? None    Result Date: 9/28/2021  1. There is a segmental colitis involving the descending and sigmoid colon with circumferential thickening of the colonic wall and pericolonic inflammatory stranding. This is likely infectious. There is free fluid in the perihepatic and perisplenic space as well as within the gutters and pelvis. This may be reactive in nature. 2. There is a cystic mass within the anterior left pelvis which I suspect is related to the left ovary. An epithelial neoplasm of the left ovary is not excluded and this would warrant gynecologic consultation and further imaging characterization. The uterus and right adnexa are unremarkable. 3. Fat-containing periumbilical hernia. 4. Heavy atheromatous calcification of the abdominal aorta and iliac arteries with no evidence of aneurysm. 5. No evidence of nephrolithiasis or obstructive uropathy. 6. Small right effusion. Bibasilar atelectasis is present. . Signed by Dr Marianne Casiano    Result Date: 9/28/2021  1. Medial right basilar opacities may be atelectasis or pneumonia. Probable left basilar atelectasis. . Signed by Dr Lizzie Schneider       Micro: 9/30/2021  3:27 PM - Sean Roth Incoming Lab Results From [facility]    Specimen Information: Stool        Component Collected Lab   Culture, Stool Abnormal  09/29/2021 11:44 AM [facility]   Absent of any normal enteric gram negative rods    Campylobacter Antigen 09/29/2021 11:44 AM [facility]   Campylobacter Antigen EIA not detected   Normal Range: Not detected    Organism Abnormal  09/29/2021 11:44 AM [facility]   Candida albicans    Culture, Stool 09/29/2021 11:44 AM [facility]   Light growth No further workup          Assessment/Plan   Principal Problem:    GIB (gastrointestinal bleeding)              -GI following                          -colonoscopy indicated severe ischemic colitis    -continue antibiotics and monitor closely              -ct abd/pelvis, possible colitis              -serial HH, stable              -amylase/lipase              -protonix IV              -type and screen, will transfuse if needed              -continue flagyl and rocephin   -IV venofer     Active Problems:    Hypertension              -continue home medications              -monitor closely       CAD (coronary artery disease)              -noted, hold ASA       Diverticulosis              -noted    UTI              -noted at OSH              -continue rocephin              -repeat UA no growth     Resolved Problems:    * No resolved hospital problems.  *        Antibiotic: rocephin and flagyl     DVT Prophylaxis: on hold for bleeding, SCDs       SAVANAH Lopez - CNP, 10/1/2021 11:03 AM

## 2021-10-02 LAB
ALBUMIN SERPL-MCNC: 2.6 G/DL (ref 3.5–5.2)
ANION GAP SERPL CALCULATED.3IONS-SCNC: 7 MMOL/L (ref 7–19)
BASOPHILS ABSOLUTE: 0 K/UL (ref 0–0.2)
BASOPHILS RELATIVE PERCENT: 0.6 % (ref 0–1)
BUN BLDV-MCNC: 3 MG/DL (ref 8–23)
CALCIUM SERPL-MCNC: 7.4 MG/DL (ref 8.8–10.2)
CHLORIDE BLD-SCNC: 111 MMOL/L (ref 98–111)
CO2: 24 MMOL/L (ref 22–29)
CREAT SERPL-MCNC: 0.3 MG/DL (ref 0.5–0.9)
EOSINOPHILS ABSOLUTE: 0.4 K/UL (ref 0–0.6)
EOSINOPHILS RELATIVE PERCENT: 5.1 % (ref 0–5)
GFR AFRICAN AMERICAN: >59
GFR NON-AFRICAN AMERICAN: >60
GLUCOSE BLD-MCNC: 104 MG/DL (ref 74–109)
HCT VFR BLD CALC: 30.2 % (ref 37–47)
HCT VFR BLD CALC: 34.4 % (ref 37–47)
HEMOGLOBIN: 10.1 G/DL (ref 12–16)
HEMOGLOBIN: 11.3 G/DL (ref 12–16)
IMMATURE GRANULOCYTES #: 0.3 K/UL
LYMPHOCYTES ABSOLUTE: 1.6 K/UL (ref 1.1–4.5)
LYMPHOCYTES RELATIVE PERCENT: 22.6 % (ref 20–40)
MAGNESIUM: 1.6 MG/DL (ref 1.6–2.4)
MCH RBC QN AUTO: 31.4 PG (ref 27–31)
MCHC RBC AUTO-ENTMCNC: 33.4 G/DL (ref 33–37)
MCV RBC AUTO: 93.8 FL (ref 81–99)
MONOCYTES ABSOLUTE: 1 K/UL (ref 0–0.9)
MONOCYTES RELATIVE PERCENT: 13.7 % (ref 0–10)
NEUTROPHILS ABSOLUTE: 3.8 K/UL (ref 1.5–7.5)
NEUTROPHILS RELATIVE PERCENT: 53.2 % (ref 50–65)
PDW BLD-RTO: 12.5 % (ref 11.5–14.5)
PLATELET # BLD: 223 K/UL (ref 130–400)
PMV BLD AUTO: 9.5 FL (ref 9.4–12.3)
POTASSIUM SERPL-SCNC: 3.4 MMOL/L (ref 3.5–5)
RBC # BLD: 3.22 M/UL (ref 4.2–5.4)
SODIUM BLD-SCNC: 142 MMOL/L (ref 136–145)
WBC # BLD: 7.1 K/UL (ref 4.8–10.8)

## 2021-10-02 PROCEDURE — 85018 HEMOGLOBIN: CPT

## 2021-10-02 PROCEDURE — 85025 COMPLETE CBC W/AUTO DIFF WBC: CPT

## 2021-10-02 PROCEDURE — 83735 ASSAY OF MAGNESIUM: CPT

## 2021-10-02 PROCEDURE — 80048 BASIC METABOLIC PNL TOTAL CA: CPT

## 2021-10-02 PROCEDURE — 2580000003 HC RX 258: Performed by: INTERNAL MEDICINE

## 2021-10-02 PROCEDURE — 99232 SBSQ HOSP IP/OBS MODERATE 35: CPT | Performed by: INTERNAL MEDICINE

## 2021-10-02 PROCEDURE — 36415 COLL VENOUS BLD VENIPUNCTURE: CPT

## 2021-10-02 PROCEDURE — 6360000002 HC RX W HCPCS: Performed by: INTERNAL MEDICINE

## 2021-10-02 PROCEDURE — 6360000002 HC RX W HCPCS: Performed by: STUDENT IN AN ORGANIZED HEALTH CARE EDUCATION/TRAINING PROGRAM

## 2021-10-02 PROCEDURE — 6370000000 HC RX 637 (ALT 250 FOR IP): Performed by: INTERNAL MEDICINE

## 2021-10-02 PROCEDURE — 6370000000 HC RX 637 (ALT 250 FOR IP): Performed by: STUDENT IN AN ORGANIZED HEALTH CARE EDUCATION/TRAINING PROGRAM

## 2021-10-02 PROCEDURE — 1210000000 HC MED SURG R&B

## 2021-10-02 PROCEDURE — 2500000003 HC RX 250 WO HCPCS: Performed by: INTERNAL MEDICINE

## 2021-10-02 PROCEDURE — 85014 HEMATOCRIT: CPT

## 2021-10-02 PROCEDURE — 82040 ASSAY OF SERUM ALBUMIN: CPT

## 2021-10-02 RX ORDER — POTASSIUM CHLORIDE 750 MG/1
40 TABLET, EXTENDED RELEASE ORAL ONCE
Status: COMPLETED | OUTPATIENT
Start: 2021-10-02 | End: 2021-10-02

## 2021-10-02 RX ADMIN — METRONIDAZOLE 500 MG: 500 INJECTION, SOLUTION INTRAVENOUS at 17:28

## 2021-10-02 RX ADMIN — POTASSIUM CHLORIDE 40 MEQ: 750 TABLET, EXTENDED RELEASE ORAL at 08:19

## 2021-10-02 RX ADMIN — POTASSIUM CHLORIDE 40 MEQ: 1500 TABLET, EXTENDED RELEASE ORAL at 04:29

## 2021-10-02 RX ADMIN — CARVEDILOL 3.12 MG: 3.12 TABLET, FILM COATED ORAL at 17:28

## 2021-10-02 RX ADMIN — ATORVASTATIN CALCIUM 20 MG: 20 TABLET, FILM COATED ORAL at 20:56

## 2021-10-02 RX ADMIN — LISINOPRIL 5 MG: 5 TABLET ORAL at 08:20

## 2021-10-02 RX ADMIN — SODIUM CHLORIDE, PRESERVATIVE FREE 1000 MG: 5 INJECTION INTRAVENOUS at 17:28

## 2021-10-02 RX ADMIN — Medication 1 TABLET: at 08:20

## 2021-10-02 RX ADMIN — IRON SUCROSE 200 MG: 20 INJECTION, SOLUTION INTRAVENOUS at 11:24

## 2021-10-02 RX ADMIN — METRONIDAZOLE 500 MG: 500 INJECTION, SOLUTION INTRAVENOUS at 08:20

## 2021-10-02 RX ADMIN — Medication 6 ML: at 20:56

## 2021-10-02 RX ADMIN — CARVEDILOL 3.12 MG: 3.12 TABLET, FILM COATED ORAL at 08:20

## 2021-10-02 RX ADMIN — METRONIDAZOLE 500 MG: 500 INJECTION, SOLUTION INTRAVENOUS at 01:57

## 2021-10-02 ASSESSMENT — ENCOUNTER SYMPTOMS
BLOOD IN STOOL: 0
BACK PAIN: 0
TROUBLE SWALLOWING: 0
ABDOMINAL PAIN: 0
CHEST TIGHTNESS: 0
SORE THROAT: 0
NAUSEA: 0
DIARRHEA: 0
SHORTNESS OF BREATH: 0
VOMITING: 0

## 2021-10-02 ASSESSMENT — PAIN SCALES - GENERAL: PAINLEVEL_OUTOF10: 0

## 2021-10-02 NOTE — PROGRESS NOTES
[facility]      Patient:  Juanita Darby  YOB: 1940  Date of Service: 10/2/2021  MRN: [id number]   Acct: [de-identified]   Primary Care Physician: No primary care provider on file. Advance Directive: Full Code  Admit Date: 9/28/2021       Hospital Day: 4  Portions of this note have been copied forward, however, changed to reflect the most current clinical status of this patient. CHIEF COMPLAINT GIB    SUBJECTIVE:  No acute changes overnight. Denies abd pain. Reports slowing of BMs. CUMULATIVE HOSPITAL COURSE:  The patient is [de-identified] y.o. female who presented to [facility] from OSH ([facility]) complaining of GIB. She originally presented to OSH for N/V/D and abd pain. She stated she was also having some urinary frequency and urgency at the time of admission to [facility] outside hospital patient had drop in hemoglobin from 15.7 to 12.5 with hemoccult positive stool. She stated the nausea and vomiting had improved. She had continued to have moderate sized dark BMs. Patient was transferred to [address] [place] [address] for GI services. Monitoring serial HH with improvement. Rocephin for UTI noted at OSH. Added flagyl as CT indicated possible colitis. Colonoscopy indicated severe ischemic colitis. EGD normal. GI recommends continuing IV antibiotics and clear liquid diet at this time. Tolerating clear liquid diet at this time. No increase in pain. HH remains stable. Venofer for iron deficiency anemia. Review of Systems:   Review of Systems   Constitutional: Positive for activity change and fatigue. Negative for appetite change (improved), chills and fever. HENT: Negative for nosebleeds, sore throat and trouble swallowing. Respiratory: Negative for chest tightness and shortness of breath. Cardiovascular: Negative for chest pain and leg swelling. Gastrointestinal: Negative for abdominal pain, blood in stool, diarrhea, nausea and vomiting.    Genitourinary: Negative for difficulty urinating, dysuria, frequency and urgency. Musculoskeletal: Negative for back pain and myalgias. Neurological: Negative for dizziness, light-headedness and headaches. Psychiatric/Behavioral: Negative for agitation and confusion. 14 point review of systems is negative except as specifically addressed above. Objective:   VITALS:  /68   Pulse 91   Temp 99.5 °F (37.5 °C) (Temporal)   Resp 18   Wt 163 lb 6 oz (74.1 kg)   SpO2 96%   24HR INTAKE/OUTPUT:      Intake/Output Summary (Last 24 hours) at 10/2/2021 1043  Last data filed at 10/2/2021 0830  Gross per 24 hour   Intake 1961 ml   Output --   Net 1961 ml       Physical Exam  Constitutional:       Appearance: She is not ill-appearing. HENT:      Head: Normocephalic. Nose: Nose normal.      Mouth/Throat:      Mouth: Mucous membranes are moist.      Pharynx: Oropharynx is clear. Eyes:      Extraocular Movements: Extraocular movements intact. Pupils: Pupils are equal, round, and reactive to light. Cardiovascular:      Rate and Rhythm: Normal rate and regular rhythm. Pulses: Normal pulses. Heart sounds: Normal heart sounds. Pulmonary:      Effort: Pulmonary effort is normal.      Breath sounds: Normal breath sounds. Abdominal:      General: Abdomen is flat. Bowel sounds are normal. There is no distension. Palpations: Abdomen is soft. Tenderness: There is no abdominal tenderness (LLQ). There is no guarding or rebound. Musculoskeletal:         General: Normal range of motion. Cervical back: Normal range of motion and neck supple. Right lower leg: No edema. Left lower leg: No edema. Skin:     General: Skin is warm and dry. Capillary Refill: Capillary refill takes less than 2 seconds. Coloration: Skin is pale. Neurological:      General: No focal deficit present. Mental Status: She is alert and oriented to person, place, and time.          Medications:      sodium chloride        iron sucrose  200 mg IntraVENous Once    metroNIDAZOLE  500 mg IntraVENous Q8H    atorvastatin  20 mg Oral Nightly    carvedilol  3.125 mg Oral BID WC    lisinopril  5 mg Oral Daily    therapeutic multivitamin-minerals  1 tablet Oral Daily    sodium chloride flush  5-40 mL IntraVENous 2 times per day    cefTRIAXone (ROCEPHIN) IV  1,000 mg IntraVENous Q24H     potassium chloride **OR** potassium alternative oral replacement **OR** potassium chloride, bisacodyl, sodium chloride flush, sodium chloride, ondansetron **OR** ondansetron, acetaminophen **OR** acetaminophen  ADULT DIET; Clear Liquid  Adult Oral Nutrition Supplement; Clear Liquid Oral Supplement     Lab and other Data:     Recent Labs     09/30/21  0609 09/30/21  1135 10/01/21  0316 10/01/21  0316 10/01/21  1035 10/01/21  1905 10/02/21  0246   WBC 6.7  --  6.1  --   --   --  7.1   HGB 10.1*   < > 10.5*   < > 10.2* 11.5* 10.1*     --  226  --   --   --  223    < > = values in this interval not displayed. Recent Labs     09/30/21  0609 09/30/21  1307 09/30/21  1912 10/01/21  0316 10/02/21  0246     --   --  140 142   K 2.9*   < > 3.5 3.4* 3.4*     --   --  110 111   CO2 23  --   --  23 24   BUN 5*  --   --  5* 3*   CREATININE 0.3*  --   --  0.3* 0.3*   GLUCOSE 103  --   --  101 104    < > = values in this interval not displayed. Recent Labs     09/30/21  0609   AST 16   ALT 18   BILITOT 0.7   ALKPHOS 134*     Troponin T: No results for input(s): TROPONINI in the last 72 hours. Pro-BNP: No results for input(s): BNP in the last 72 hours. INR:   No results for input(s): INR in the last 72 hours. UA:  No results for input(s): NITRITE, COLORU, PHUR, LABCAST, WBCUA, RBCUA, MUCUS, TRICHOMONAS, YEAST, BACTERIA, CLARITYU, SPECGRAV, LEUKOCYTESUR, UROBILINOGEN, BILIRUBINUR, BLOODU, GLUCOSEU, AMORPHOUS in the last 72 hours. Invalid input(s): [employer]  A1C:   No results for input(s): LABA1C in the last 72 hours.   ABG:No results for input(s): PHART, RUA8RDZ, PO2ART, YSW4MPC, BEART, HGBAE, C5HBKCOE, CARBOXHGBART in the last 72 hours. RAD:   CT ABDOMEN PELVIS WO CONTRAST Additional Contrast? None    Result Date: 9/28/2021  1. There is a segmental colitis involving the descending and sigmoid colon with circumferential thickening of the colonic wall and pericolonic inflammatory stranding. This is likely infectious. There is free fluid in the perihepatic and perisplenic space as well as within the gutters and pelvis. This may be reactive in nature. 2. There is a cystic mass within the anterior left pelvis which I suspect is related to the left ovary. An epithelial neoplasm of the left ovary is not excluded and this would warrant gynecologic consultation and further imaging characterization. The uterus and right adnexa are unremarkable. 3. Fat-containing periumbilical hernia. 4. Heavy atheromatous calcification of the abdominal aorta and iliac arteries with no evidence of aneurysm. 5. No evidence of nephrolithiasis or obstructive uropathy. 6. Small right effusion. Bibasilar atelectasis is present. . Signed by Dr Ginna Franks    Result Date: 9/28/2021  1. Medial right basilar opacities may be atelectasis or pneumonia. Probable left basilar atelectasis. . Signed by Dr Amarilys Corrigan       Micro: 9/30/2021  3:27 PM - Sean Roth Incoming Lab Results From [facility]    Specimen Information: Stool        Component Collected Lab   Culture, Stool Abnormal  09/29/2021 11:44 AM [facility]   Absent of any normal enteric gram negative rods    Campylobacter Antigen 09/29/2021 11:44 AM [facility]   Campylobacter Antigen EIA not detected   Normal Range: Not detected    Organism Abnormal  09/29/2021 11:44 AM [facility]   Candida albicans    Culture, Stool 09/29/2021 11:44 AM [facility]   Light growth   No further workup          Assessment/Plan   Principal Problem:    GIB (gastrointestinal bleeding)              -GI following                          -colonoscopy indicated severe ischemic colitis    -continue antibiotics and monitor closely              -ct abd/pelvis, possible colitis              -serial HH, stable              -amylase/lipase              -protonix IV              -type and screen, will transfuse if needed              -continue flagyl and rocephin   -IV venofer for 3 doses total   -monitor and replaced potassium     Active Problems:    Hypertension              -continue home medications              -monitor closely       CAD (coronary artery disease)              -noted, hold ASA       Diverticulosis              -noted    UTI              -noted at OSH              -continue rocephin              -repeat UA no growth     Resolved Problems:    * No resolved hospital problems.  *        Antibiotic: rocephin and flagyl     DVT Prophylaxis: on hold for bleeding, SCDs       SAVANAH Sotelo - CNP, 10/2/2021 10:43 AM

## 2021-10-02 NOTE — PROGRESS NOTES
Minimal nausea tolerating clear liquids, no abdominal pain, afebrile, no deterioration on abdominal exam, brown loose stool without blood, continue observation for potential gangrenous left colon over weekend, advance to full liquid diet Sunday and discharge Monday if well.

## 2021-10-03 LAB
ALBUMIN SERPL-MCNC: 2.7 G/DL (ref 3.5–5.2)
ANION GAP SERPL CALCULATED.3IONS-SCNC: 7 MMOL/L (ref 7–19)
ATYPICAL LYMPHOCYTE RELATIVE PERCENT: 3 % (ref 0–8)
BASOPHILS ABSOLUTE: 0 K/UL (ref 0–0.2)
BASOPHILS RELATIVE PERCENT: 0 % (ref 0–1)
BUN BLDV-MCNC: 2 MG/DL (ref 8–23)
CALCIUM SERPL-MCNC: 7.7 MG/DL (ref 8.8–10.2)
CHLORIDE BLD-SCNC: 109 MMOL/L (ref 98–111)
CO2: 24 MMOL/L (ref 22–29)
CREAT SERPL-MCNC: 0.2 MG/DL (ref 0.5–0.9)
EOSINOPHILS ABSOLUTE: 0.24 K/UL (ref 0–0.6)
EOSINOPHILS RELATIVE PERCENT: 3 % (ref 0–5)
GFR AFRICAN AMERICAN: >59
GFR NON-AFRICAN AMERICAN: >60
GLUCOSE BLD-MCNC: 108 MG/DL (ref 74–109)
HCT VFR BLD CALC: 30.6 % (ref 37–47)
HCT VFR BLD CALC: 31.3 % (ref 37–47)
HCT VFR BLD CALC: 34.5 % (ref 37–47)
HEMOGLOBIN: 10.2 G/DL (ref 12–16)
HEMOGLOBIN: 10.4 G/DL (ref 12–16)
HEMOGLOBIN: 11.3 G/DL (ref 12–16)
IMMATURE GRANULOCYTES #: 0.4 K/UL
LYMPHOCYTES ABSOLUTE: 2.3 K/UL (ref 1.1–4.5)
LYMPHOCYTES RELATIVE PERCENT: 26 % (ref 20–40)
MAGNESIUM: 1.6 MG/DL (ref 1.6–2.4)
MCH RBC QN AUTO: 31.6 PG (ref 27–31)
MCHC RBC AUTO-ENTMCNC: 33.3 G/DL (ref 33–37)
MCV RBC AUTO: 94.7 FL (ref 81–99)
MONOCYTES ABSOLUTE: 0.7 K/UL (ref 0–0.9)
MONOCYTES RELATIVE PERCENT: 9 % (ref 0–10)
MYELOCYTE PERCENT: 6 %
NEUTROPHILS ABSOLUTE: 4.7 K/UL (ref 1.5–7.5)
NEUTROPHILS RELATIVE PERCENT: 53 % (ref 50–65)
PDW BLD-RTO: 12.5 % (ref 11.5–14.5)
PLATELET # BLD: 252 K/UL (ref 130–400)
PLATELET SLIDE REVIEW: ADEQUATE
PMV BLD AUTO: 9.3 FL (ref 9.4–12.3)
POTASSIUM SERPL-SCNC: 3.6 MMOL/L (ref 3.5–5)
RBC # BLD: 3.23 M/UL (ref 4.2–5.4)
RBC # BLD: NORMAL 10*6/UL
SODIUM BLD-SCNC: 140 MMOL/L (ref 136–145)
WBC # BLD: 8 K/UL (ref 4.8–10.8)

## 2021-10-03 PROCEDURE — 82040 ASSAY OF SERUM ALBUMIN: CPT

## 2021-10-03 PROCEDURE — 36415 COLL VENOUS BLD VENIPUNCTURE: CPT

## 2021-10-03 PROCEDURE — 85025 COMPLETE CBC W/AUTO DIFF WBC: CPT

## 2021-10-03 PROCEDURE — 85018 HEMOGLOBIN: CPT

## 2021-10-03 PROCEDURE — 2500000003 HC RX 250 WO HCPCS: Performed by: INTERNAL MEDICINE

## 2021-10-03 PROCEDURE — 6370000000 HC RX 637 (ALT 250 FOR IP): Performed by: INTERNAL MEDICINE

## 2021-10-03 PROCEDURE — 85014 HEMATOCRIT: CPT

## 2021-10-03 PROCEDURE — 99232 SBSQ HOSP IP/OBS MODERATE 35: CPT | Performed by: INTERNAL MEDICINE

## 2021-10-03 PROCEDURE — 2580000003 HC RX 258: Performed by: INTERNAL MEDICINE

## 2021-10-03 PROCEDURE — 1210000000 HC MED SURG R&B

## 2021-10-03 PROCEDURE — 83735 ASSAY OF MAGNESIUM: CPT

## 2021-10-03 PROCEDURE — 80048 BASIC METABOLIC PNL TOTAL CA: CPT

## 2021-10-03 RX ADMIN — CARVEDILOL 3.12 MG: 3.12 TABLET, FILM COATED ORAL at 16:19

## 2021-10-03 RX ADMIN — METRONIDAZOLE 500 MG: 500 INJECTION, SOLUTION INTRAVENOUS at 09:27

## 2021-10-03 RX ADMIN — METRONIDAZOLE 500 MG: 500 INJECTION, SOLUTION INTRAVENOUS at 01:06

## 2021-10-03 RX ADMIN — CARVEDILOL 3.12 MG: 3.12 TABLET, FILM COATED ORAL at 09:27

## 2021-10-03 RX ADMIN — LISINOPRIL 5 MG: 5 TABLET ORAL at 09:27

## 2021-10-03 RX ADMIN — Medication 10 ML: at 20:34

## 2021-10-03 RX ADMIN — METRONIDAZOLE 500 MG: 500 INJECTION, SOLUTION INTRAVENOUS at 16:19

## 2021-10-03 RX ADMIN — ATORVASTATIN CALCIUM 20 MG: 20 TABLET, FILM COATED ORAL at 20:34

## 2021-10-03 RX ADMIN — Medication 10 ML: at 09:27

## 2021-10-03 RX ADMIN — Medication 1 TABLET: at 09:27

## 2021-10-03 ASSESSMENT — ENCOUNTER SYMPTOMS
BACK PAIN: 0
SORE THROAT: 0
NAUSEA: 0
ABDOMINAL PAIN: 0
SHORTNESS OF BREATH: 0
VOMITING: 0
BLOOD IN STOOL: 0
TROUBLE SWALLOWING: 0
DIARRHEA: 0
CHEST TIGHTNESS: 0

## 2021-10-03 ASSESSMENT — PAIN SCALES - GENERAL: PAINLEVEL_OUTOF10: 0

## 2021-10-03 NOTE — PROGRESS NOTES
Severe segmental Ischemic Colitis without evolution of complications;  no abdominal pain, fever, leukocytosis, or tenderness; Advancing diet as tolerated, discontinue antibiotics, discharge tomorrow if feeling well tomorrow, arrange for follow-up in GI clinic in 2 weeks. She will eventually need a follow-up colonoscopy in 1 month.

## 2021-10-04 LAB
ALBUMIN SERPL-MCNC: 2.5 G/DL (ref 3.5–5.2)
ANION GAP SERPL CALCULATED.3IONS-SCNC: 5 MMOL/L (ref 7–19)
ATYPICAL LYMPHOCYTE RELATIVE PERCENT: 1 % (ref 0–8)
BASOPHILS ABSOLUTE: 0 K/UL (ref 0–0.2)
BASOPHILS RELATIVE PERCENT: 0 % (ref 0–1)
BUN BLDV-MCNC: 2 MG/DL (ref 8–23)
CALCIUM SERPL-MCNC: 8.1 MG/DL (ref 8.8–10.2)
CAMPYLOBACTER ANTIGEN: ABNORMAL
CHLORIDE BLD-SCNC: 107 MMOL/L (ref 98–111)
CO2: 27 MMOL/L (ref 22–29)
CREAT SERPL-MCNC: 0.3 MG/DL (ref 0.5–0.9)
CULTURE, STOOL: ABNORMAL
CULTURE, STOOL: ABNORMAL
E COLI SHIGA TOXIN ASSAY: ABNORMAL
EOSINOPHILS ABSOLUTE: 0.66 K/UL (ref 0–0.6)
EOSINOPHILS RELATIVE PERCENT: 8 % (ref 0–5)
GFR AFRICAN AMERICAN: >59
GFR NON-AFRICAN AMERICAN: >60
GLUCOSE BLD-MCNC: 107 MG/DL (ref 74–109)
HCT VFR BLD CALC: 30.9 % (ref 37–47)
HCT VFR BLD CALC: 34 % (ref 37–47)
HEMOGLOBIN: 10.2 G/DL (ref 12–16)
HEMOGLOBIN: 11.3 G/DL (ref 12–16)
IMMATURE GRANULOCYTES #: 0.4 K/UL
LYMPHOCYTES ABSOLUTE: 1.3 K/UL (ref 1.1–4.5)
LYMPHOCYTES RELATIVE PERCENT: 15 % (ref 20–40)
MAGNESIUM: 1.7 MG/DL (ref 1.6–2.4)
MCH RBC QN AUTO: 31.7 PG (ref 27–31)
MCHC RBC AUTO-ENTMCNC: 33 G/DL (ref 33–37)
MCV RBC AUTO: 96 FL (ref 81–99)
MONOCYTES ABSOLUTE: 0.1 K/UL (ref 0–0.9)
MONOCYTES RELATIVE PERCENT: 1 % (ref 0–10)
NEUTROPHILS ABSOLUTE: 6.2 K/UL (ref 1.5–7.5)
NEUTROPHILS RELATIVE PERCENT: 75 % (ref 50–65)
ORGANISM: ABNORMAL
OVALOCYTES: ABNORMAL
PDW BLD-RTO: 12.6 % (ref 11.5–14.5)
PLATELET # BLD: 270 K/UL (ref 130–400)
PLATELET SLIDE REVIEW: ADEQUATE
PMV BLD AUTO: 9.2 FL (ref 9.4–12.3)
POTASSIUM SERPL-SCNC: 3.5 MMOL/L (ref 3.5–5)
RBC # BLD: 3.22 M/UL (ref 4.2–5.4)
SARS-COV-2, NAAT: NOT DETECTED
SODIUM BLD-SCNC: 139 MMOL/L (ref 136–145)
WBC # BLD: 8.2 K/UL (ref 4.8–10.8)

## 2021-10-04 PROCEDURE — 85014 HEMATOCRIT: CPT

## 2021-10-04 PROCEDURE — 1210000000 HC MED SURG R&B

## 2021-10-04 PROCEDURE — 87635 SARS-COV-2 COVID-19 AMP PRB: CPT

## 2021-10-04 PROCEDURE — 97530 THERAPEUTIC ACTIVITIES: CPT

## 2021-10-04 PROCEDURE — 97116 GAIT TRAINING THERAPY: CPT

## 2021-10-04 PROCEDURE — 6370000000 HC RX 637 (ALT 250 FOR IP): Performed by: INTERNAL MEDICINE

## 2021-10-04 PROCEDURE — 85025 COMPLETE CBC W/AUTO DIFF WBC: CPT

## 2021-10-04 PROCEDURE — 36415 COLL VENOUS BLD VENIPUNCTURE: CPT

## 2021-10-04 PROCEDURE — 80048 BASIC METABOLIC PNL TOTAL CA: CPT

## 2021-10-04 PROCEDURE — 85018 HEMOGLOBIN: CPT

## 2021-10-04 PROCEDURE — 2580000003 HC RX 258: Performed by: INTERNAL MEDICINE

## 2021-10-04 PROCEDURE — 83735 ASSAY OF MAGNESIUM: CPT

## 2021-10-04 PROCEDURE — 2500000003 HC RX 250 WO HCPCS: Performed by: INTERNAL MEDICINE

## 2021-10-04 PROCEDURE — 82040 ASSAY OF SERUM ALBUMIN: CPT

## 2021-10-04 RX ORDER — PANTOPRAZOLE SODIUM 40 MG/1
40 TABLET, DELAYED RELEASE ORAL
Status: DISCONTINUED | OUTPATIENT
Start: 2021-10-05 | End: 2021-10-05 | Stop reason: HOSPADM

## 2021-10-04 RX ADMIN — POTASSIUM CHLORIDE 40 MEQ: 1500 TABLET, EXTENDED RELEASE ORAL at 05:43

## 2021-10-04 RX ADMIN — METRONIDAZOLE 500 MG: 500 INJECTION, SOLUTION INTRAVENOUS at 01:14

## 2021-10-04 RX ADMIN — Medication 10 ML: at 08:39

## 2021-10-04 RX ADMIN — CARVEDILOL 3.12 MG: 3.12 TABLET, FILM COATED ORAL at 17:56

## 2021-10-04 RX ADMIN — ATORVASTATIN CALCIUM 20 MG: 20 TABLET, FILM COATED ORAL at 21:41

## 2021-10-04 RX ADMIN — LISINOPRIL 5 MG: 5 TABLET ORAL at 08:39

## 2021-10-04 RX ADMIN — Medication 1 TABLET: at 08:39

## 2021-10-04 RX ADMIN — Medication 10 ML: at 21:41

## 2021-10-04 RX ADMIN — METRONIDAZOLE 500 MG: 500 INJECTION, SOLUTION INTRAVENOUS at 08:39

## 2021-10-04 RX ADMIN — CARVEDILOL 3.12 MG: 3.12 TABLET, FILM COATED ORAL at 08:39

## 2021-10-04 ASSESSMENT — ENCOUNTER SYMPTOMS
ABDOMINAL DISTENTION: 0
COUGH: 0
WHEEZING: 0
TROUBLE SWALLOWING: 0
SORE THROAT: 0
VOMITING: 0
ABDOMINAL PAIN: 0
SHORTNESS OF BREATH: 0
BLOOD IN STOOL: 0
CONSTIPATION: 0
DIARRHEA: 0
SINUS PAIN: 0
NAUSEA: 0

## 2021-10-04 ASSESSMENT — PAIN SCALES - GENERAL
PAINLEVEL_OUTOF10: 0
PAINLEVEL_OUTOF10: 0

## 2021-10-04 NOTE — PROGRESS NOTES
Occupational Therapy  Facility/Department: [facility] R[facility]WAN/VAS/MED  Daily Treatment Note  NAME: Annia Friedman  : 1940  MRN: [id number]    Date of Service: 10/4/2021    Discharge Recommendations:  Patient would benefit from continued therapy after discharge       Assessment   Assessment: Did well with transfers but will need to use RW until she regains her PLOF            Patient Diagnosis(es): There were no encounter diagnoses. has a past medical history of Brain aneurysm, CAD (coronary artery disease), Diverticulitis, H/O heart artery stent, and Hypertension. has a past surgical history that includes craniotomy; Upper gastrointestinal endoscopy (N/A, 2021); and sigmoidoscopy (N/A, 2021). Restrictions  Restrictions/Precautions  Restrictions/Precautions: Fall Risk  Required Braces or Orthoses?: No  Subjective   General  Chart Reviewed: Yes  Patient assessed for rehabilitation services?: Yes  Response to previous treatment: Patient with no complaints from previous session  Family / Caregiver Present: No  Subjective  Subjective: Pt up in recliner upon arrival for therapy. Pt agreeable to participate.        Orientation     Objective    ADL  LE Dressing: Stand by assistance              Transfers  Stand Step Transfers: Contact guard assistance  Sit to stand: Contact guard assistance  Transfer Comments: Using RW to get around the room                                                                 Plan   Plan  Times per week: 3-5  Times per day: Daily  Current Treatment Recommendations: Strengthening, Pain Management, Positioning, ROM, Safety Education & Training, Balance Training, Self-Care / ADL, Patient/Caregiver Education & Training, Cognitive/Perceptual Training, Functional Mobility Training, Equipment Evaluation, Education, & procurement, Home Management Training, Endurance Training  G-Code     OutComes Score                                                  AM-PAC Score             Goals  Short term goals  Time Frame for Short term goals: 1 week  Short term goal 1: Complete light, ambulatory ADLs for 5 min with CGA and PRN DME. Short term goal 2: Complete toileting skills with supervision. Short term goal 3: Complete all standing components of dressing with CGA. Long term goals  Long term goal 1: Return to PLOF.        Therapy Time   Individual Concurrent Group Co-treatment   Time In           Time Out           Minutes                   Dorothy Santana OT

## 2021-10-04 NOTE — PROGRESS NOTES
[facility]      Progress Note    Patient:  Pop Rivera  YOB: 1940  Date of Service: 10/4/2021  MRN: [id number]   Acct: [de-identified]   Primary Care Physician: No primary care provider on file. Advance Directive: Full Code  Admit Date: 9/28/2021       Hospital Day: 6    Portions of this note have been copied forward, however, updated to reflect the most current clinical status of this patient. CHIEF COMPLAINT GIB    SUBJECTIVE:  Ms. Byron Suh was resting comfortably in chair this  Morning. Denies shortness of breath or chest pain at this time. Denies bright red blood in stool. Reports soft BMs. CUMULATIVE HOSPITAL COURSE:   The patient is [de-identified] y.o. female who presented to [facility] from OSH ([facility]) complaining of GIB. Ms. Byron Suh presented to OSH with N/V/D and abdominal pain. Reported having urinary frequency and urgency at time of admission to OSH. At OSH patient had drop in hemoglobin from 15.7-12.5 with Hemoccult positive stool. Nausea and vomiting had improved. Continues to have moderate sized dark stools. Was transferred to [facility] for GI services. Rocephin was initiated for UTI OSH. H&H remained stable. Flagyl was added as CT of abdomen indicated possible colitis. Colonoscopy revealed severe ischemic colitis. EGD normal.  GI recommended discontinuing IV antibiotic and clear liquid diet. Venofer for iron deficiency anemia. Diet increase to full liquid.           Review of Systems   Constitutional: Positive for fatigue. Negative for chills, diaphoresis and fever. HENT: Negative for congestion, ear pain, sinus pain, sore throat and trouble swallowing. Eyes: Negative for visual disturbance. Respiratory: Negative for cough, shortness of breath and wheezing. Denies shortness of breath   Cardiovascular: Negative for chest pain, palpitations and leg swelling.         Denies chest pain   Gastrointestinal: Negative for abdominal distention, abdominal pain, blood in stool, constipation, diarrhea, nausea and vomiting. Endocrine: Negative for cold intolerance and heat intolerance. Genitourinary: Negative for difficulty urinating, flank pain, frequency and urgency. Musculoskeletal: Negative for arthralgias and myalgias. Neurological: Negative for dizziness, syncope, weakness, light-headedness, numbness and headaches. Hematological: Does not bruise/bleed easily. Psychiatric/Behavioral: Negative for agitation, confusion and dysphoric mood. Objective:   VITALS:  BP (!) 123/54   Pulse 87   Temp 97.7 °F (36.5 °C) (Temporal)   Resp 16   Wt 163 lb 6 oz (74.1 kg)   SpO2 95%   24HR INTAKE/OUTPUT:    Intake/Output Summary (Last 24 hours) at 10/4/2021 1042  Last data filed at 10/4/2021 1007  Gross per 24 hour   Intake 1610 ml   Output 600 ml   Net 1010 ml         Physical Exam  Constitutional:       General: She is not in acute distress. Appearance: Normal appearance. She is not toxic-appearing or diaphoretic. HENT:      Head: Normocephalic and atraumatic. Right Ear: External ear normal.      Left Ear: External ear normal.      Nose: Nose normal. No congestion or rhinorrhea. Mouth/Throat:      Mouth: Mucous membranes are moist.      Pharynx: Oropharynx is clear. Eyes:      General: No scleral icterus. Extraocular Movements: Extraocular movements intact. Conjunctiva/sclera: Conjunctivae normal.   Cardiovascular:      Rate and Rhythm: Normal rate and regular rhythm. Pulses: Normal pulses. Heart sounds: Normal heart sounds. No murmur heard. No friction rub. No gallop. Pulmonary:      Effort: Pulmonary effort is normal. No respiratory distress. Breath sounds: Normal breath sounds. No wheezing, rhonchi or rales. Abdominal:      General: Abdomen is flat. Bowel sounds are normal. There is no distension. Palpations: Abdomen is soft. Tenderness: There is no abdominal tenderness.    Musculoskeletal: General: No swelling. Normal range of motion. Cervical back: Normal range of motion and neck supple. Right lower leg: No edema. Left lower leg: No edema. Skin:     General: Skin is warm and dry. Coloration: Skin is pale. Skin is not jaundiced. Findings: No erythema, lesion or rash. Neurological:      General: No focal deficit present. Mental Status: She is alert and oriented to person, place, and time. Mental status is at baseline. Cranial Nerves: No cranial nerve deficit. Sensory: No sensory deficit. Motor: No weakness. Psychiatric:         Mood and Affect: Mood normal.         Behavior: Behavior normal.         Thought Content: Thought content normal.         Judgment: Judgment normal.            Medications:      sodium chloride        atorvastatin  20 mg Oral Nightly    carvedilol  3.125 mg Oral BID WC    lisinopril  5 mg Oral Daily    therapeutic multivitamin-minerals  1 tablet Oral Daily    sodium chloride flush  5-40 mL IntraVENous 2 times per day     potassium chloride **OR** potassium alternative oral replacement **OR** potassium chloride, bisacodyl, sodium chloride flush, sodium chloride, ondansetron **OR** ondansetron, acetaminophen **OR** acetaminophen  Adult Oral Nutrition Supplement; Clear Liquid Oral Supplement  ADULT DIET; Dysphagia - Soft and Bite Sized     Lab and other Data:     Recent Labs     10/02/21  0246 10/02/21  1450 10/03/21  0539 10/03/21  1522 10/04/21  0256   WBC 7.1  --  8.0  --  8.2   HGB 10.1*   < > 10.2* 11.3* 10.2*     --  252  --  270    < > = values in this interval not displayed. Recent Labs     10/02/21  0246 10/03/21  0323 10/04/21  0256    140 139   K 3.4* 3.6 3.5    109 107   CO2 24 24 27   BUN 3* 2* 2*   CREATININE 0.3* 0.2* 0.3*   GLUCOSE 104 108 107       RAD:     CT ABDOMEN PELVIS WO CONTRAST Additional Contrast? None  Result Date: 9/28/2021    1.  There is a segmental colitis involving the descending and sigmoid colon with circumferential thickening of the colonic wall and pericolonic inflammatory stranding. This is likely infectious. There is free fluid in the perihepatic and perisplenic space as well as within the gutters and pelvis. This may be reactive in nature. 2. There is a cystic mass within the anterior left pelvis which I suspect is related to the left ovary. An epithelial neoplasm of the left ovary is not excluded and this would warrant gynecologic consultation and further imaging characterization. The uterus and right adnexa are unremarkable. 3. Fat-containing periumbilical hernia. 4. Heavy atheromatous calcification of the abdominal aorta and iliac arteries with no evidence of aneurysm. 5. No evidence of nephrolithiasis or obstructive uropathy. 6. Small right effusion. Bibasilar atelectasis is present. . Signed by Dr Summer Merchant  Result Date: 9/28/2021    1. Medial right basilar opacities may be atelectasis or pneumonia. Probable left basilar atelectasis. . Signed by Dr Brandi Dan      Micro:    C. Difficile-negative    Culture, Stool [[id number]] (Abnormal) Collected: 09/29/21 1144   Order Status: Completed Specimen: Stool Updated: 10/01/21 1129    Culture, Stool Absent of any normal enteric gram negative rodsAbnormal     E coli, Shiga toxin Assay --    Negative  No Shiga toxins 1 and 2 detected   Normal Range:  Not detected     Campylobacter Antigen --    Campylobacter Antigen EIA not detected   Normal Range: Not detected     Organism Candida albicansAbnormal     Culture, Stool --    Light growth   No further workup    Narrative:     ORDER#: [id number]                          ORDERED BY: Darrel Matos   SOURCE: Stool Stool                        COLLECTED:  09/29/21 11:44   ANTIBIOTICS AT [facility]. :                      RECEIVED :  09/29/21 11:44         Assessment/Plan   Principal Problem:    GIB (gastrointestinal bleeding)  Active Problems: Hypertension    CAD (coronary artery disease)    Diverticulosis    Colitis  Resolved Problems:    * No resolved hospital problems. *    Principal Problem:    GIB (gastrointestinal bleeding)-    - GI following     -Colonoscopy revealed severe ischemic colitis    -Continue antibiotics and monitor closely              - PO Protonix daily               - Avoid NSAIDs/anticoagulations              - Monitor H&H closely, stable at this time              - Transfuse for hemoglobin less than 7 per protocol              - Monitor stools for color              - Monitor on telemetry       Active Problems:    Hypertension- stable at this time, continue current medications, monitor BP closely      CAD (coronary artery disease)- noted, continue current medication, hold ASA      Diverticulosis/ Colitis-  noted,    UTI-   - Was given Rocephin at OSH    - Monitor I's and O's closely   - Completed IV Flagyl          DVT Prophylaxis: SCDs,    GI prophylaxis: Protonix    Further Orders per Clinical course/attending. Electronically signed by SAVANAH Casper CNP on 10/4/2021 at 10:42 AM       EMR Dragon/Transcription disclaimer:   Much of this encounter note is an electronic transcription/translation of spoken language to printed text.  The electronic translation of spoken language may permit erroneous, or at times, nonsensical words or phrases to be inadvertently transcribed; although attempts have made to review the note for such errors, some may still exist.

## 2021-10-04 NOTE — PROGRESS NOTES
Physical Therapy  Name: Daily Gil  MRN:  [id number]  Date of service:  10/4/2021       10/04/21 0944   Restrictions/Precautions   Restrictions/Precautions Fall Risk   Required Braces or Orthoses? No   Subjective   Subjective Pt agreed to therapy. Pain Screening   Patient Currently in Pain No   Transfers   Sit to Stand Contact guard assistance   Stand to sit Contact guard assistance   Ambulation   Ambulation? Yes   Ambulation 1   Surface level tile   Device Rolling Walker   Assistance Contact guard assistance   Quality of Gait steady, no LOB noted   Gait Deviations Slow Leelee;Decreased step length;Decreased step height   Distance 40'   Comments amb in room   Short term goals   Time Frame for Short term goals 2 wks   Short term goal 1 supine to sit indep   Short term goal 2 sit to stand indep   Short term goal 3 amb. 200' indep with RW   Conditions Requiring Skilled Therapeutic Intervention   Body structures, Functions, Activity limitations Decreased functional mobility ; Decreased ROM; Decreased strength;Decreased balance;Decreased safe awareness;Decreased coordination;Decreased posture   Assessment Pt able to amb in room with steady gait, using RW. Pt reported no increase in pain or fatigue with amb. Pt returned to chair with all needs in reach. Activity Tolerance   Activity Tolerance Patient Tolerated treatment well   Safety Devices   Type of devices Call light within reach; Left in chair;Chair alarm in place       Electronically signed by Daily Gil PTA on 10/4/2021 at 9:57 AM

## 2021-10-04 NOTE — CARE COORDINATION
10/4/21: Pt can go to [address] [place] [address]  [phone] p  [phone] or [phone]  Electronically signed by JONATHAN Mo on 10/4/2021 at 1:36 PM

## 2021-10-04 NOTE — PROGRESS NOTES
12 hour chart check review completed. Electronically signed by Philip Mcpherson LPN on 53/1/5487 at 30:18 AM

## 2021-10-04 NOTE — PROGRESS NOTES
Nutrition Assessment     Type and Reason for Visit: Initial, RD Nutrition Re-Screen/LOS    Nutrition Assessment:  LOS day 6. Po intake improved since admission with diet progression. No documented s/sx of diet intolerance since advance to regular consistency (% meals). Pt recieving ONS. Continue current POC. Malnutrition Assessment:  Malnutrition Status: No malnutrition    Nutrition Related Findings: +1 BLE edema      Current Nutrition Therapies:    Adult Oral Nutrition Supplement; Clear Liquid Oral Supplement  ADULT DIET; Dysphagia - Soft and Bite Sized    Anthropometric Measures:  · Height:   none available  · Current Body Wt: 163 lb (73.9 kg)   · BMI:      Nutrition Diagnosis:   · Altered GI function related to acute injury/trauma as evidenced by GI abnormality    Nutrition Interventions:   Food and/or Nutrient Delivery:  Continue Current Diet  Nutrition Education/Counseling:  Education not indicated   Coordination of Nutrition Care:  Continue to monitor while inpatient    Goals:  Po intake remains >50% meals with no s/sx of intolerance.        Nutrition Monitoring and Evaluation:   Food/Nutrient Intake Outcomes:  Food and Nutrient Intake, Supplement Intake  Physical Signs/Symptoms Outcomes:  Biochemical Data, GI Status, Weight, Nutrition Focused Physical Findings     Discharge Planning:    Continue current diet     Electronically signed by Lidia Homans, MS, RD, LD on 10/4/21 at 12:11 PM CDT    Contact: [phone]

## 2021-10-05 VITALS
OXYGEN SATURATION: 95 % | RESPIRATION RATE: 16 BRPM | DIASTOLIC BLOOD PRESSURE: 63 MMHG | TEMPERATURE: 96.9 F | WEIGHT: 163.38 LBS | SYSTOLIC BLOOD PRESSURE: 106 MMHG | HEART RATE: 75 BPM

## 2021-10-05 LAB
ALBUMIN SERPL-MCNC: 2.5 G/DL (ref 3.5–5.2)
ANION GAP SERPL CALCULATED.3IONS-SCNC: 7 MMOL/L (ref 7–19)
BASOPHILS ABSOLUTE: 0.1 K/UL (ref 0–0.2)
BASOPHILS RELATIVE PERCENT: 0.6 % (ref 0–1)
BUN BLDV-MCNC: 4 MG/DL (ref 8–23)
CALCIUM SERPL-MCNC: 8.1 MG/DL (ref 8.8–10.2)
CHLORIDE BLD-SCNC: 107 MMOL/L (ref 98–111)
CO2: 27 MMOL/L (ref 22–29)
CREAT SERPL-MCNC: 0.4 MG/DL (ref 0.5–0.9)
EOSINOPHILS ABSOLUTE: 0.3 K/UL (ref 0–0.6)
EOSINOPHILS RELATIVE PERCENT: 3.1 % (ref 0–5)
GFR AFRICAN AMERICAN: >59
GFR NON-AFRICAN AMERICAN: >60
GLUCOSE BLD-MCNC: 106 MG/DL (ref 74–109)
HCT VFR BLD CALC: 31.3 % (ref 37–47)
HEMOGLOBIN: 10 G/DL (ref 12–16)
IMMATURE GRANULOCYTES #: 0.3 K/UL
LYMPHOCYTES ABSOLUTE: 1.6 K/UL (ref 1.1–4.5)
LYMPHOCYTES RELATIVE PERCENT: 18.7 % (ref 20–40)
MAGNESIUM: 1.8 MG/DL (ref 1.6–2.4)
MCH RBC QN AUTO: 31.6 PG (ref 27–31)
MCHC RBC AUTO-ENTMCNC: 31.9 G/DL (ref 33–37)
MCV RBC AUTO: 99.1 FL (ref 81–99)
MONOCYTES ABSOLUTE: 0.9 K/UL (ref 0–0.9)
MONOCYTES RELATIVE PERCENT: 10.8 % (ref 0–10)
NEUTROPHILS ABSOLUTE: 5.5 K/UL (ref 1.5–7.5)
NEUTROPHILS RELATIVE PERCENT: 63.9 % (ref 50–65)
PDW BLD-RTO: 13 % (ref 11.5–14.5)
PLATELET # BLD: 291 K/UL (ref 130–400)
PMV BLD AUTO: 9.5 FL (ref 9.4–12.3)
POTASSIUM SERPL-SCNC: 4.1 MMOL/L (ref 3.5–5)
RBC # BLD: 3.16 M/UL (ref 4.2–5.4)
SODIUM BLD-SCNC: 141 MMOL/L (ref 136–145)
WBC # BLD: 8.7 K/UL (ref 4.8–10.8)

## 2021-10-05 PROCEDURE — 82040 ASSAY OF SERUM ALBUMIN: CPT

## 2021-10-05 PROCEDURE — 83735 ASSAY OF MAGNESIUM: CPT

## 2021-10-05 PROCEDURE — 97116 GAIT TRAINING THERAPY: CPT

## 2021-10-05 PROCEDURE — 6370000000 HC RX 637 (ALT 250 FOR IP): Performed by: NURSE PRACTITIONER

## 2021-10-05 PROCEDURE — 80048 BASIC METABOLIC PNL TOTAL CA: CPT

## 2021-10-05 PROCEDURE — 85025 COMPLETE CBC W/AUTO DIFF WBC: CPT

## 2021-10-05 PROCEDURE — 36415 COLL VENOUS BLD VENIPUNCTURE: CPT

## 2021-10-05 PROCEDURE — 97535 SELF CARE MNGMENT TRAINING: CPT

## 2021-10-05 PROCEDURE — 97530 THERAPEUTIC ACTIVITIES: CPT

## 2021-10-05 PROCEDURE — 2580000003 HC RX 258: Performed by: INTERNAL MEDICINE

## 2021-10-05 PROCEDURE — 6370000000 HC RX 637 (ALT 250 FOR IP): Performed by: INTERNAL MEDICINE

## 2021-10-05 RX ORDER — PANTOPRAZOLE SODIUM 40 MG/1
40 TABLET, DELAYED RELEASE ORAL
Qty: 30 TABLET | Refills: 0 | Status: SHIPPED | OUTPATIENT
Start: 2021-10-06

## 2021-10-05 RX ADMIN — PANTOPRAZOLE SODIUM 40 MG: 40 TABLET, DELAYED RELEASE ORAL at 05:45

## 2021-10-05 RX ADMIN — Medication 1 TABLET: at 08:52

## 2021-10-05 RX ADMIN — LISINOPRIL 5 MG: 5 TABLET ORAL at 08:52

## 2021-10-05 RX ADMIN — Medication 10 ML: at 08:52

## 2021-10-05 RX ADMIN — CARVEDILOL 3.12 MG: 3.12 TABLET, FILM COATED ORAL at 08:52

## 2021-10-05 ASSESSMENT — PAIN SCALES - GENERAL
PAINLEVEL_OUTOF10: 0
PAINLEVEL_OUTOF10: 0

## 2021-10-05 NOTE — PROGRESS NOTES
Report called to [facility] bed [phone]. Report was given to Minta Hashimoto). Daughter will be transporting patient to facility. IV access and telemetry discontinued.   Release of information form was requested and signed by family to forward all medical/diagnostic/discharge summary to Dr. John Morris at [facility], [address] [place] [address] Drive., [facility], [address] [address]

## 2021-10-05 NOTE — DISCHARGE SUMMARY
[address] [facility]    Discharge Summary      Ed Yolanda  :  1940  MRN:  [id number]    Admit date:  2021  Discharge date:    10/5/2021    Discharging Physician:  Dr. Dolores Jeans     Advance Directive: Full Code    Consults: GI    Primary Care Physician:  No primary care provider on file. Discharge Diagnoses:  Principal Problem:    GIB (gastrointestinal bleeding)  Active Problems:    Hypertension    CAD (coronary artery disease)    Diverticulosis    Colitis  Resolved Problems:    * No resolved hospital problems. *      Portions of this note have been copied forward, however, changed to reflect the most current clinical status of this patient. Hospital Course: The patient is [de-identified] y.o. female who presented to [facility] from OS ([facility]) complaining of GIB. Ms. Jennyfer Ron presented to OSH with N/V/D and abdominal pain. Reported having urinary frequency and urgency at time of admission to OSH. At OSH patient had drop in hemoglobin from 15.7 to 12.5 with Hemoccult positive stool. Nausea and vomiting had improved. Continued to have moderate sized dark stools. Was transferred to [address] [place] [address] for GI services. Rocephin was initiated for UTI OSH. H&H remained stable. Flagyl was added as CT of abdomen indicated possible colitis. Colonoscopy revealed severe ischemic colitis. EGD normal.  GI recommended discontinuing IV antibiotic and clear liquid diet. Venofer for iron deficiency anemia. Diet increased to full liquid. Patient was able to tolerate diet well. Denies blood in stool. She has been accepted to [facility] swing bed. She is being discharged on Protonix. She has been instructed to follow-up with PCP in 2 days for repeat CBC and resumption of aspirin 325 if stable Hgb. Patient is currently in stable condition to be discharged to [facility] swing bed. Significant Diagnostic Studies:     CT ABDOMEN PELVIS WO CONTRAST Additional Contrast? None  Result Date: 2021    1.  There is a segmental colitis involving the descending and sigmoid colon with circumferential thickening of the colonic wall and pericolonic inflammatory stranding. This is likely infectious. There is free fluid in the perihepatic and perisplenic space as well as within the gutters and pelvis. This may be reactive in nature. 2. There is a cystic mass within the anterior left pelvis which I suspect is related to the left ovary. An epithelial neoplasm of the left ovary is not excluded and this would warrant gynecologic consultation and further imaging characterization. The uterus and right adnexa are unremarkable. 3. Fat-containing periumbilical hernia. 4. Heavy atheromatous calcification of the abdominal aorta and iliac arteries with no evidence of aneurysm. 5. No evidence of nephrolithiasis or obstructive uropathy. 6. Small right effusion. Bibasilar atelectasis is present. . Signed by Dr Nanda Escobedo  Result Date: 9/28/2021.    1. Medial right basilar opacities may be atelectasis or pneumonia. Probable left basilar atelectasis. . Signed by Dr Adryan Pack      Pertinent Labs:   CBC:   Recent Labs     10/03/21  0539 10/03/21  1522 10/04/21  0256 10/04/21  1457 10/05/21  0307   WBC 8.0  --  8.2  --  8.7   HGB 10.2*   < > 10.2* 11.3* 10.0*     --  270  --  291    < > = values in this interval not displayed. BMP:    Recent Labs     10/03/21  0323 10/04/21  0256 10/05/21  0307    139 141   K 3.6 3.5 4.1    107 107   CO2 24 27 27   BUN 2* 2* 4*   CREATININE 0.2* 0.3* 0.4*   GLUCOSE 108 107 106       Physical Exam:   Vital Signs: /63   Pulse 75   Temp 96.9 °F (36.1 °C) (Temporal)   Resp 16   Wt 163 lb 6 oz (74.1 kg)   SpO2 95%   General appearance:. Alert and Cooperative   HEENT: Normocephalic. Chest: Lung sounds clear bilaterally without wheezes or rhonchi. Cardiac: RRR, S1, S2 normal. No murmurs, gallops, or rubs auscultated.    Abdomen: soft, non-tender; non-distended normal bowel sounds no masses, no organomegaly. Extremities: No clubbing or cyanosis. No peripheral edema. Peripheral pulses palpable. Neurologic: Grossly intact. Discharge Medications:          Medication List      START taking these medications    pantoprazole 40 MG tablet  Commonly known as: PROTONIX  Take 1 tablet by mouth every morning (before breakfast)  Start taking on: October 6, 2021        Laya Hernández taking these medications    atorvastatin 20 MG tablet  Commonly known as: LIPITOR     carvedilol 3.125 MG tablet  Commonly known as: COREG     famotidine 20 MG tablet  Commonly known as: PEPCID     lactobacillus Caps capsule     lisinopril 5 MG tablet  Commonly known as: PRINIVIL;ZESTRIL     NONFORMULARY     OMEGA 3 PO        STOP taking these medications    aspirin 325 MG tablet           Where to Get Your Medications      These medications were sent to [address] [place] [address], [address] [place] [id number]    Phone: [phone]   · pantoprazole 40 MG tablet            Discharge Instructions: Follow up with No primary care provider on file. within 2 business days of Discharge for repeat CBC and hospital follow-up. Follow-up with GI in 2 weeks. Take medications as directed. Resume activity as tolerated. Diet: Adult Oral Nutrition Supplement; Clear Liquid Oral Supplement  ADULT DIET; Dysphagia - Soft and Bite Sized     Disposition: Patient is medically stable and will be discharged to [facility] swing bed. Time spent on discharge 42 minutes spent in assessing patient, reviewing medications, discussion with nursing, confirming safe discharge plan and preparation of discharge summary.     Signed:  Electronically signed by SAVANAH Flowers CNP on 10/5/21 at 9:58 AM CDT         EMR Dragon/Transcription disclaimer:   Much of this encounter note is an electronic transcription/translation of spoken language to printed text.  The electronic translation of spoken language may permit erroneous, or at times, nonsensical words or phrases to be inadvertently transcribed; although attempts have made to review the note for such errors, some may still exist.

## 2021-10-05 NOTE — CARE COORDINATION
10/5/21: [address] [facility] bed today; please call report to: [phone]; and fax d/c summary, scripts, and AVS to: [phone]  Electronically signed by JONATHAN Pro on 10/5/2021 at 11:14 AM

## 2021-10-05 NOTE — PLAN OF CARE
Problem: Falls - Risk of:  Goal: Will remain free from falls  Description: Will remain free from falls  Outcome: Ongoing  Goal: Absence of physical injury  Description: Absence of physical injury  Outcome: Ongoing     Problem: Infection:  Goal: Will remain free from infection  Description: Will remain free from infection  Outcome: Ongoing     Problem: Safety:  Goal: Free from accidental physical injury  Description: Free from accidental physical injury  Outcome: Ongoing  Goal: Free from intentional harm  Description: Free from intentional harm  Outcome: Ongoing     Problem: Daily Care:  Goal: Daily care needs are met  Description: Daily care needs are met  Outcome: Ongoing     Problem: Pain:  Goal: Patient's pain/discomfort is manageable  Description: Patient's pain/discomfort is manageable  Outcome: Ongoing     Problem: Skin Integrity:  Goal: Skin integrity will stabilize  Description: Skin integrity will stabilize  Outcome: Ongoing     Problem: Discharge Planning:  Goal: Patients continuum of care needs are met  Description: Patients continuum of care needs are met  Outcome: Ongoing
Problem: Falls - Risk of:  Goal: Will remain free from falls  Description: Will remain free from falls  Outcome: Ongoing  Goal: Absence of physical injury  Description: Absence of physical injury  Outcome: Ongoing     Problem: Infection:  Goal: Will remain free from infection  Description: Will remain free from infection  Outcome: Ongoing     Problem: Safety:  Goal: Free from accidental physical injury  Description: Free from accidental physical injury  Outcome: Ongoing  Goal: Free from intentional harm  Description: Free from intentional harm  Outcome: Ongoing     Problem: Daily Care:  Goal: Daily care needs are met  Description: Daily care needs are met  Outcome: Ongoing     Problem: Pain:  Goal: Patient's pain/discomfort is manageable  Description: Patient's pain/discomfort is manageable  Outcome: Ongoing     Problem: Skin Integrity:  Goal: Skin integrity will stabilize  Description: Skin integrity will stabilize  Outcome: Ongoing     Problem: Discharge Planning:  Goal: Patients continuum of care needs are met  Description: Patients continuum of care needs are met  Outcome: Ongoing     Problem: ABCDS Injury Assessment  Goal: Absence of physical injury  Outcome: Ongoing
Problem: Falls - Risk of:  Goal: Will remain free from falls  Description: Will remain free from falls  Outcome: Ongoing  Goal: Absence of physical injury  Description: Absence of physical injury  Outcome: Ongoing     Problem: Infection:  Goal: Will remain free from infection  Description: Will remain free from infection  Outcome: Ongoing     Problem: Safety:  Goal: Free from accidental physical injury  Description: Free from accidental physical injury  Outcome: Ongoing  Goal: Free from intentional harm  Description: Free from intentional harm  Outcome: Ongoing     Problem: Daily Care:  Goal: Daily care needs are met  Description: Daily care needs are met  Outcome: Ongoing     Problem: Pain:  Goal: Patient's pain/discomfort is manageable  Description: Patient's pain/discomfort is manageable  Outcome: Ongoing     Problem: Skin Integrity:  Goal: Skin integrity will stabilize  Description: Skin integrity will stabilize  Outcome: Ongoing     Problem: Discharge Planning:  Goal: Patients continuum of care needs are met  Description: Patients continuum of care needs are met  Outcome: Ongoing     Problem: ABCDS Injury Assessment  Goal: Absence of physical injury  Outcome: Ongoing     Problem: Fluid Volume:  Goal: Ability to achieve a balanced intake and output will improve  Description: Ability to achieve a balanced intake and output will improve  Outcome: Ongoing     Problem: Physical Regulation:  Goal: Ability to maintain clinical measurements within normal limits will improve  Description: Ability to maintain clinical measurements within normal limits will improve  Outcome: Ongoing  Goal: Will show no signs and symptoms of electrolyte imbalance  Description: Will show no signs and symptoms of electrolyte imbalance  Outcome: Ongoing
Problem: Falls - Risk of:  Goal: Will remain free from falls  Description: Will remain free from falls  Outcome: Ongoing  Goal: Absence of physical injury  Description: Absence of physical injury  Outcome: Ongoing     Problem: Infection:  Goal: Will remain free from infection  Description: Will remain free from infection  Outcome: Ongoing     Problem: Safety:  Goal: Free from accidental physical injury  Description: Free from accidental physical injury  Outcome: Ongoing  Goal: Free from intentional harm  Description: Free from intentional harm  Outcome: Ongoing     Problem: Daily Care:  Goal: Daily care needs are met  Description: Daily care needs are met  Outcome: Ongoing     Problem: Pain:  Goal: Patient's pain/discomfort is manageable  Description: Patient's pain/discomfort is manageable  Outcome: Ongoing     Problem: Skin Integrity:  Goal: Skin integrity will stabilize  Description: Skin integrity will stabilize  Outcome: Ongoing     Problem: Discharge Planning:  Goal: Patients continuum of care needs are met  Description: Patients continuum of care needs are met  Outcome: Ongoing     Problem: ABCDS Injury Assessment  Goal: Absence of physical injury  Outcome: Ongoing     Problem: Fluid Volume:  Goal: Ability to achieve a balanced intake and output will improve  Description: Ability to achieve a balanced intake and output will improve  Outcome: Ongoing     Problem: Physical Regulation:  Goal: Ability to maintain clinical measurements within normal limits will improve  Description: Ability to maintain clinical measurements within normal limits will improve  Outcome: Ongoing  Goal: Will show no signs and symptoms of electrolyte imbalance  Description: Will show no signs and symptoms of electrolyte imbalance  Outcome: Ongoing
Ongoing     Problem: Skin Integrity:  Goal: Skin integrity will stabilize  Description: Skin integrity will stabilize  10/3/2021 0345 by Khalif Mckeon LPN  Outcome: Ongoing  10/3/2021 0345 by Khalif Mckeon LPN  Outcome: Ongoing  10/3/2021 0344 by Khalif Mckeon LPN  Outcome: Ongoing     Problem: Discharge Planning:  Goal: Patients continuum of care needs are met  Description: Patients continuum of care needs are met  10/3/2021 0345 by Khalif Mckeon LPN  Outcome: Ongoing  10/3/2021 0345 by Khalif Mckeon LPN  Outcome: Ongoing  10/3/2021 0344 by Khalif Mckeon LPN  Outcome: Ongoing     Problem: ABCDS Injury Assessment  Goal: Absence of physical injury  10/3/2021 0345 by Khalif Mckeon LPN  Outcome: Ongoing  10/3/2021 0345 by Khalif Mckeon LPN  Outcome: Ongoing  10/3/2021 0344 by Khalif Mckeon LPN  Outcome: Ongoing     Problem: Fluid Volume:  Goal: Ability to achieve a balanced intake and output will improve  Description: Ability to achieve a balanced intake and output will improve  10/3/2021 0345 by Khalif Mckeon LPN  Outcome: Ongoing  10/3/2021 0345 by Khalif Mckeon LPN  Outcome: Ongoing     Problem: Physical Regulation:  Goal: Ability to maintain clinical measurements within normal limits will improve  Description: Ability to maintain clinical measurements within normal limits will improve  10/3/2021 0345 by Khalif Mckeon LPN  Outcome: Ongoing  10/3/2021 0345 by Khalif Mckeon LPN  Outcome: Ongoing  Goal: Will show no signs and symptoms of electrolyte imbalance  Description: Will show no signs and symptoms of electrolyte imbalance  10/3/2021 0345 by Khalif Mckeon LPN  Outcome: Ongoing  10/3/2021 0345 by Khalif Mckeon LPN  Outcome: Ongoing

## 2021-10-05 NOTE — PROGRESS NOTES
Occupational Therapy  Facility/Department: [facility]WAN/[facility]AS/MED  Daily Treatment Note  NAME: Curt Pat  : 1940  MRN: [id number]    Date of Service: 10/5/2021    Discharge Recommendations:  Patient would benefit from continued therapy after discharge       Assessment   Performance deficits / Impairments: Decreased functional mobility ; Decreased ADL status; Decreased strength;Decreased balance;Decreased endurance;Decreased high-level IADLs  Assessment: Pt making progress. Pt using RW to transfer from Toilet to chair. Prognosis: Good  OT Education: Equipment; Family Education; ADL Adaptive Strategies;Transfer Training  Patient Education: Educating patient and daughter regarding equipment for [employer]. REQUIRES OT FOLLOW UP: Yes  Activity Tolerance  Activity Tolerance: Patient Tolerated treatment well  Activity Tolerance: Pt tolerated treatment well. Safety Devices  Safety Devices in place: Yes  Type of devices: Call light within reach; Chair alarm in place; Left in chair;Nurse notified         Patient Diagnosis(es): There were no encounter diagnoses. has a past medical history of Brain aneurysm, CAD (coronary artery disease), Diverticulitis, H/O heart artery stent, and Hypertension. has a past surgical history that includes craniotomy; Upper gastrointestinal endoscopy (N/A, 2021); and sigmoidoscopy (N/A, 2021). Restrictions  Restrictions/Precautions  Restrictions/Precautions: Fall Risk  Required Braces or Orthoses?: No  Subjective   General  Chart Reviewed: Yes  Patient assessed for rehabilitation services?: Yes  Response to previous treatment: Patient with no complaints from previous session  Family / Caregiver Present: No  Subjective  Subjective: Pt up in recliner upon arrival for therapy. Pt agreeable to participate.    Vital Signs  Patient Currently in Pain: No   Orientation  Orientation  Overall Orientation Status: Within Functional Limits  Objective    ADL  Toileting: Stand by assistance  Additional Comments: Pt was independent with washing her hands and drying them. Toilet Transfers  Toilet - Technique: Stand step  Equipment Used: Standard bedside commode  Toilet Transfer: Supervision;Contact guard assistance     Transfers  Stand Step Transfers: Contact guard assistance  Sit to stand: Contact guard assistance  Transfer Comments: Using RW to get around the room      Plan   Plan  Times per week: 3-5  Times per day: Daily  Current Treatment Recommendations: Strengthening, Pain Management, Positioning, ROM, Safety Education & Training, Balance Training, Self-Care / ADL, Patient/Caregiver Education & Training, Cognitive/Perceptual Training, Functional Mobility Training, Equipment Evaluation, Education, & procurement, Home Management Training, Endurance Training       Goals  Short term goals  Time Frame for Short term goals: 1 week  Short term goal 1: Complete light, ambulatory ADLs for 5 min with CGA and PRN DME. Goal Met. Short term goal 2: Complete toileting skills with supervision. Goal MET. Short term goal 3: Complete all standing components of dressing with CGA. Long term goals  Long term goal 1: Return to PLOF.        Therapy Time   Individual Concurrent Group Co-treatment   Time In           Time Out           Minutes                   Nohemi Harper OT Electronically signed by LORI Vargas MOT, OTR/L on 10/5/2021 at 2:46 PM

## 2021-11-05 ENCOUNTER — TELEPHONE (OUTPATIENT)
Dept: GASTROENTEROLOGY | Age: 81
End: 2021-11-05

## 2021-11-05 ENCOUNTER — VIRTUAL VISIT (OUTPATIENT)
Dept: GASTROENTEROLOGY | Age: 81
End: 2021-11-05
Payer: MEDICARE

## 2021-11-05 DIAGNOSIS — K55.9 ISCHEMIC COLITIS (HCC): Primary | ICD-10-CM

## 2021-11-05 PROCEDURE — 1123F ACP DISCUSS/DSCN MKR DOCD: CPT | Performed by: NURSE PRACTITIONER

## 2021-11-05 PROCEDURE — G8421 BMI NOT CALCULATED: HCPCS | Performed by: NURSE PRACTITIONER

## 2021-11-05 PROCEDURE — 1090F PRES/ABSN URINE INCON ASSESS: CPT | Performed by: NURSE PRACTITIONER

## 2021-11-05 PROCEDURE — 1036F TOBACCO NON-USER: CPT | Performed by: NURSE PRACTITIONER

## 2021-11-05 PROCEDURE — G8400 PT W/DXA NO RESULTS DOC: HCPCS | Performed by: NURSE PRACTITIONER

## 2021-11-05 PROCEDURE — G8484 FLU IMMUNIZE NO ADMIN: HCPCS | Performed by: NURSE PRACTITIONER

## 2021-11-05 PROCEDURE — G8428 CUR MEDS NOT DOCUMENT: HCPCS | Performed by: NURSE PRACTITIONER

## 2021-11-05 PROCEDURE — 99204 OFFICE O/P NEW MOD 45 MIN: CPT | Performed by: NURSE PRACTITIONER

## 2021-11-05 PROCEDURE — 4040F PNEUMOC VAC/ADMIN/RCVD: CPT | Performed by: NURSE PRACTITIONER

## 2021-11-05 RX ORDER — DOCUSATE SODIUM 100 MG/1
100 CAPSULE, LIQUID FILLED ORAL NIGHTLY
COMMUNITY

## 2021-11-05 ASSESSMENT — ENCOUNTER SYMPTOMS
NAUSEA: 0
ABDOMINAL PAIN: 0
ANAL BLEEDING: 0
ABDOMINAL DISTENTION: 0
CONSTIPATION: 0
VOMITING: 0
TROUBLE SWALLOWING: 0
BLOOD IN STOOL: 0
DIARRHEA: 0
COUGH: 0
RECTAL PAIN: 0
SHORTNESS OF BREATH: 0
CHOKING: 0

## 2021-11-05 NOTE — TELEPHONE ENCOUNTER
Sabino Webb requests that the office return their call. The best time to reach her is Anytime. Patient called and wants to change her colonoscopy appointment to earlier in the day. Thank you.

## 2021-11-05 NOTE — PROGRESS NOTES
(PEPCID) 20 MG tablet Take 20 mg by mouth 2 times daily  Historical Provider, MD   lisinopril (PRINIVIL;ZESTRIL) 5 MG tablet Take 5 mg by mouth daily  Historical Provider, MD   Omega-3 Fatty Acids (OMEGA 3 PO) Take 1 capsule by mouth 2 times daily  Historical Provider, MD   NONFORMULARY 1 capsule daily [employer]  Historical Provider, MD       Social History     Tobacco Use    Smoking status: Never Smoker    Smokeless tobacco: Never Used   Vaping Use    Vaping Use: Never used   Substance Use Topics    Alcohol use: Yes     Alcohol/week: 7.0 standard drinks     Types: 7 Glasses of wine per week     Comment: 1/2 glass nightly     Drug use: Never        Allergies   Allergen Reactions    Adhesive Tape      Redness/bruising    Phenergan [Promethazine]      Hallucinations   ,   Past Medical History:   Diagnosis Date    Brain aneurysm 2019    history of brain aneurysm with surgical intervention at [facility] in [facility] CAD (coronary artery disease)     Diverticulitis     H/O heart artery stent     Hypertension    ,   Past Surgical History:   Procedure Laterality Date   [address] N/A 09/30/2021    Dr Landeros Loges colitis starting at 40 cm from the anal verge involving the descending colon and splenic flexure, but sparing the distal rectosigmoid colon    UPPER GASTROINTESTINAL ENDOSCOPY N/A 09/30/2021    Dr Linton-Normal esophagus, stomach, and duodenum   ,   Social History     Tobacco Use    Smoking status: Never Smoker    Smokeless tobacco: Never Used   Vaping Use    Vaping Use: Never used   Substance Use Topics    Alcohol use:  Yes     Alcohol/week: 7.0 standard drinks     Types: 7 Glasses of wine per week     Comment: 1/2 glass nightly     Drug use: Never   ,   Family History   Adopted: Yes   Problem Relation Age of Onset    Colon Cancer Neg Hx     Esophageal Cancer Neg Hx     Liver Cancer Neg Hx     Stomach Cancer Neg Hx     Rectal Cancer Neg Hx        PHYSICAL EXAMINATION:  [ INSTRUCTIONS:  \"[x]\" Indicates a positive item  \"[]\" Indicates a negative item  -- DELETE ALL ITEMS NOT EXAMINED]  Vital Signs: (As obtained by patient/caregiver or practitioner observation)    Blood pressure-  Heart rate-    Respiratory rate-    Temperature-  Pulse oximetry-     Constitutional: [x] Appears well-developed and well-nourished [x] No apparent distress      [] Abnormal-   Mental status  [x] Alert and awake  [x] Oriented to person/place/time [x]Able to follow commands      Eyes:  EOM    [x]  Normal  [] Abnormal-  Sclera  [x]  Normal  [] Abnormal -         Discharge [x]  None visible  [] Abnormal -    HENT:   [x] Normocephalic, atraumatic. [] Abnormal   [x] Mouth/Throat: Mucous membranes are moist.     External Ears [x] Normal  [] Abnormal-     Neck: [x] No visualized mass     Pulmonary/Chest: [x] Respiratory effort normal.  [x] No visualized signs of difficulty breathing or respiratory distress        [] Abnormal-      Musculoskeletal:   [x] Normal gait with no signs of ataxia         [x] Normal range of motion of neck        [] Abnormal-       Neurological:        [x] No Facial Asymmetry (Cranial nerve 7 motor function) (limited exam to video visit)          [x] No gaze palsy        [] Abnormal-         Skin:        [x] No significant exanthematous lesions or discoloration noted on facial skin         [] Abnormal-            Psychiatric:       [x] Normal Affect [x] No Hallucinations        [] Abnormal-     Other pertinent observable physical exam findings-     ASSESSMENT/PLAN:  1. Ischemic colitis ([facility] [address]      - Ok for ASA 81 mg po daily, check with Neurology regarding dose adjustment  - Schedule colonoscopy  Instruct on bowel prep. Nothing to eat or drink after midnight the day of the exam.  Unable to drive for 24 hours after the procedure. No aspirin or nonsteroidal anti-inflammatories for 5 days before procedure.   I have discussed the benefits, alternatives, and risks (including bleeding, perforation and death)  for pursuing Endoscopy (EGD/Colonscopy/EUS/ERCP) with the patient and they are willing to continue. We also discussed the need for anesthesia, IV access, proper dietary changes, medication changes if necessary, and need for bowel prep (if ordered) prior to their Endoscopic procedure. They are aware they must have someone accompany them to their scheduled procedure to drive them home - they agree to the above and are willing to continue. Return if symptoms worsen or fail to improve. Capo Wade, was evaluated through a synchronous (real-time) audio-video encounter. The patient (or guardian if applicable) is aware that this is a billable service. Verbal consent to proceed has been obtained within the past 12 months. The visit was conducted pursuant to the emergency declaration under the [address] [place] [address], [address] [place] [address] authority and the [employer] General Act. Patient identification was verified, and a caregiver was present when appropriate. The patient was located in a state where the provider was credentialed to provide care. Total time spent on this encounter: Not billed by time    --SAVANAH Bridges NP on 11/5/2021 at 11:19 AM    An electronic signature was used to authenticate this note.

## 2021-11-05 NOTE — PATIENT INSTRUCTIONS
Schedule colonoscopy. No aspirin, ibuprofen, naproxen, fish oil or vitamin E for 5 days before procedure. Do not eat or drink after midnight the day of the procedure. Allowed medications can be taken with a small sip of water. Please review your prep instructions for allowed medications. You will not be able to drive for 24 hours after the procedure due to sedation. You must have a responsible adult, 25 year or older, to accompany you and drive you home the day of your procedure. If you are on blood thinners, clearance from the prescribing physician will be obtained before your procedure is scheduled. If it is determined it is not safe to hold these medications for a short time an alternative procedure for evaluation may be recommended. Risks of colonoscopy include, but are not limited to, perforation, bleeding, and infection, Risk of perforation and bleeding are increased if there is a polyp removed. Anesthesia risks will be reviewed with you before the procedure by a member of the anesthesia department. Your physician may also schedule a follow up appointment with the nurse practitioner to discuss pathology, symptoms or to check if you have had any problems related to your procedure. If you prefer not to return to the office after your procedure please discuss this with your physician on the day of your colonoscopy. The physician will talk with you and/or your family after the procedure is completed. Final recommendations are based on the pathologist report if biopsies or specimens are taken. If polyps are removed during the procedure they will be sent to a pathologist for analysis. Unless you have a follow up appointment scheduled, you will be notified by mail of the pathology results within 4 weeks. If you have not received results after 4 weeks you may call the office to obtain this information. For Colonoscopy:   You will be given specific directions regarding restrictions to diet and bowel prep instructions including laxatives. Please read these instructions one week prior to your scheduled procedure to ensure that you are prepared. If you have any questions regarding these instructions please call our office Mon through Fri from 8:00 am to 4:00 pm.     Follow prep instructions provided for bowel prep. Take all of the bowel prep as directed. If you are having problems with nausea, stop your prep for 30-45 min to allow the nausea to subside before resuming your prep. It is important to drink plenty of fluids throughout the day before taking your laxatives. This will help to protect your kidneys, prevent dehydration and maximize the effect of the bowel prep. Your diet before a colonoscopy bowel preparation is very important to ensure a successful colon exam. It is recommended to consider certain changes to your diet three to four days prior to the procedure. Remember that your bowels need to be completely empty for the exam.    What foods are good to eat? Cut down on heavy solid foods three to four days before the procedure and start introducing lighter meals to your diet. The following food suggestions are a good part of your diet before a colonoscopy bowel preparation.  Light meat that is easily digestible such as chicken (without the skin)    Potatoes without skin    Cheese    Eggs    A light meal of steamed white fish    Light clear soups    Foods and drinks to avoid  Avoid foods that contain too much fiber. Stay clear of dark colored beverages. They can stick to the walls of the digestive tract and make it difficult to differentiate from blood.  Some of these foods are:   Red meat, rice, nuts and vegetables    Milk, other milk based fluids and cream    Most fruit and puddings    Whole grain pasta    Cereals, bran and seeds    Colored beverages, especially those that are red or purple in color    Red colored Jell-O     On the day before the colonoscopy, continue to drink plenty of clear fluids. It is important   to keep yourself hydrated before the exam.     Please follow all instructions as provided for cleansing the bowel. Failure to have an adequately prepped colon may cause you to have incomplete exam with further testing required.      [email or web address]

## 2021-11-10 ENCOUNTER — TELEPHONE (OUTPATIENT)
Dept: GASTROENTEROLOGY | Age: 81
End: 2021-11-10

## 2021-11-10 NOTE — TELEPHONE ENCOUNTER
[id number] - [address] [facility], [id number] [place] [address] Mumtaz [phone]   [address] [facility], [facility] [address] [address]   Phone:  [phone]  Fax:  [phone]       Order Class:    Order Class   Normal [1]     Warnings History    Total number of overridden warnings: 1   Full Warnings History   pantoprazole (PROTONIX) 40 MG tablet    Date: 10/5/2021 Department: [facility] 3 Angel/Vas/Med Ordering/Authorizing: SAVANAH Salter - CNP     Outpatient Medication Detail     Disp Refills Start End    pantoprazole (PROTONIX) 40 MG tablet 30 tablet 0 10/6/2021     Sig - Route: Take 1 tablet by mouth every morning (before breakfast) - Oral    Sent to pharmacy as: Pantoprazole Sodium 40 MG Oral Tablet Delayed Release (PROTONIX)    E-Prescribing Status: Receipt confirmed by pharmacy (10/5/2021 12:06 PM CDT)          Last [facility]y aprn 11-5-21 for IC. No FU scheduled. CLN scheduled 1-6-22 . Patient called today from [phone] said the above Rx was from DR. Balaji Estrada [facility] the [facility], said she only got # 30 x 0 refills and was wondering if she needs to keep taking this. I spoke to [facility] aprn and she said the patient last EGD with  was normal and she had previously been on Pepcid 20 mg Bid and was doing well with this and the patient agrees. [facility] aprn said she can stop the Pantoprazole 40 mg daily and go back to her Pepcid 20 mg Bid as long as she feels she did well with this and the patient does. Patient thanked me for the return call and voiced understanding.  Blayne antonio

## (undated) DEVICE — ENDO KIT,LOURDES HOSPITAL: Brand: MEDLINE INDUSTRIES, INC.